# Patient Record
Sex: FEMALE | Race: BLACK OR AFRICAN AMERICAN | NOT HISPANIC OR LATINO | URBAN - METROPOLITAN AREA
[De-identification: names, ages, dates, MRNs, and addresses within clinical notes are randomized per-mention and may not be internally consistent; named-entity substitution may affect disease eponyms.]

---

## 2020-09-04 ENCOUNTER — HOSPITAL ENCOUNTER (OUTPATIENT)
Dept: OTHER | Facility: HOSPITAL | Age: 65
Discharge: HOME | End: 2020-09-04

## 2020-09-04 LAB
25(OH)D3 SERPL-MCNC: 22.7 NG/ML (ref 30–100)
ALBUMIN SERPL-MCNC: 2.8 G/DL (ref 3.5–5)
ALBUMIN/GLOB SERPL: 0.7 {RATIO} (ref 1.4–2.6)
ALP SERPL-CCNC: 90 U/L (ref 43–160)
ALT SERPL-CCNC: 14 U/L (ref 10–40)
ANION GAP SERPL CALC-SCNC: 19 MMOL/L (ref 8–19)
AST SERPL-CCNC: 42 U/L (ref 15–50)
BASOPHILS # BLD AUTO: 0.05 10*3/UL (ref 0–0.2)
BASOPHILS NFR BLD AUTO: 0.4 % (ref 0–3)
BILIRUB SERPL-MCNC: 0.27 MG/DL (ref 0.2–1.3)
BUN SERPL-MCNC: 19 MG/DL (ref 5–25)
BUN/CREAT SERPL: 25 {RATIO} (ref 6–20)
CALCIUM SERPL-MCNC: 10.3 MG/DL (ref 8.7–10.4)
CHLORIDE SERPL-SCNC: 98 MMOL/L (ref 99–111)
CONV ABS IMM GRAN: 0.13 10*3/UL (ref 0–0.2)
CONV CO2: 26 MMOL/L (ref 22–32)
CONV IMMATURE GRAN: 1.1 % (ref 0–1.8)
CONV TOTAL PROTEIN: 6.6 G/DL (ref 6.3–8.2)
CREAT UR-MCNC: 0.77 MG/DL (ref 0.5–0.9)
DEPRECATED RDW RBC AUTO: 46.5 FL (ref 36.4–46.3)
EOSINOPHIL # BLD AUTO: 0.31 10*3/UL (ref 0–0.7)
EOSINOPHIL # BLD AUTO: 2.5 % (ref 0–7)
ERYTHROCYTE [DISTWIDTH] IN BLOOD BY AUTOMATED COUNT: 15 % (ref 11.7–14.4)
GFR SERPLBLD BASED ON 1.73 SQ M-ARVRAT: >60 ML/MIN/{1.73_M2}
GLOBULIN UR ELPH-MCNC: 3.8 G/DL (ref 2–3.5)
GLUCOSE SERPL-MCNC: 141 MG/DL (ref 65–99)
HCT VFR BLD AUTO: 30.5 % (ref 37–47)
HGB BLD-MCNC: 10.2 G/DL (ref 12–16)
LYMPHOCYTES # BLD AUTO: 2.36 10*3/UL (ref 1–5)
LYMPHOCYTES NFR BLD AUTO: 19.4 % (ref 20–45)
MCH RBC QN AUTO: 29.8 PG (ref 27–31)
MCHC RBC AUTO-ENTMCNC: 33.4 G/DL (ref 33–37)
MCV RBC AUTO: 89.2 FL (ref 81–99)
MONOCYTES # BLD AUTO: 0.84 10*3/UL (ref 0.2–1.2)
MONOCYTES NFR BLD AUTO: 6.9 % (ref 3–10)
NEUTROPHILS # BLD AUTO: 8.48 10*3/UL (ref 2–8)
NEUTROPHILS NFR BLD AUTO: 69.7 % (ref 30–85)
NRBC CBCN: 0.2 % (ref 0–0.7)
OSMOLALITY SERPL CALC.SUM OF ELEC: 293 MOSM/KG (ref 273–304)
PLATELET # BLD AUTO: 306 10*3/UL (ref 130–400)
PMV BLD AUTO: 10.8 FL (ref 9.4–12.3)
POTASSIUM SERPL-SCNC: 3.9 MMOL/L (ref 3.5–5.3)
RBC # BLD AUTO: 3.42 10*6/UL (ref 4.2–5.4)
SODIUM SERPL-SCNC: 139 MMOL/L (ref 135–147)
WBC # BLD AUTO: 12.17 10*3/UL (ref 4.8–10.8)

## 2020-09-07 ENCOUNTER — HOSPITAL ENCOUNTER (OUTPATIENT)
Dept: OTHER | Facility: HOSPITAL | Age: 65
Discharge: HOME | End: 2020-09-07

## 2020-09-07 LAB
BASOPHILS # BLD AUTO: 0.03 10*3/UL (ref 0–0.2)
BASOPHILS NFR BLD AUTO: 0.4 % (ref 0–3)
CONV ABS IMM GRAN: 0.07 10*3/UL (ref 0–0.2)
CONV IMMATURE GRAN: 0.8 % (ref 0–1.8)
DEPRECATED RDW RBC AUTO: 46.4 FL (ref 36.4–46.3)
EOSINOPHIL # BLD AUTO: 0.26 10*3/UL (ref 0–0.7)
EOSINOPHIL # BLD AUTO: 3 % (ref 0–7)
ERYTHROCYTE [DISTWIDTH] IN BLOOD BY AUTOMATED COUNT: 14.7 % (ref 11.7–14.4)
HCT VFR BLD AUTO: 31 % (ref 37–47)
HGB BLD-MCNC: 10.1 G/DL (ref 12–16)
LYMPHOCYTES # BLD AUTO: 1.44 10*3/UL (ref 1–5)
LYMPHOCYTES NFR BLD AUTO: 16.8 % (ref 20–45)
MCH RBC QN AUTO: 29.3 PG (ref 27–31)
MCHC RBC AUTO-ENTMCNC: 32.6 G/DL (ref 33–37)
MCV RBC AUTO: 89.9 FL (ref 81–99)
MONOCYTES # BLD AUTO: 0.7 10*3/UL (ref 0.2–1.2)
MONOCYTES NFR BLD AUTO: 8.2 % (ref 3–10)
NEUTROPHILS # BLD AUTO: 6.06 10*3/UL (ref 2–8)
NEUTROPHILS NFR BLD AUTO: 70.8 % (ref 30–85)
NRBC CBCN: 0 % (ref 0–0.7)
PLATELET # BLD AUTO: 211 10*3/UL (ref 130–400)
PMV BLD AUTO: 10.8 FL (ref 9.4–12.3)
RBC # BLD AUTO: 3.45 10*6/UL (ref 4.2–5.4)
WBC # BLD AUTO: 8.56 10*3/UL (ref 4.8–10.8)

## 2020-09-08 ENCOUNTER — HOSPITAL ENCOUNTER (OUTPATIENT)
Dept: OTHER | Facility: HOSPITAL | Age: 65
Discharge: HOME | End: 2020-09-08

## 2020-09-08 LAB
ANION GAP SERPL CALC-SCNC: 17 MMOL/L (ref 8–19)
BUN SERPL-MCNC: 17 MG/DL (ref 5–25)
BUN/CREAT SERPL: 20 {RATIO} (ref 6–20)
CALCIUM SERPL-MCNC: 9.1 MG/DL (ref 8.7–10.4)
CHLORIDE SERPL-SCNC: 97 MMOL/L (ref 99–111)
CONV CO2: 28 MMOL/L (ref 22–32)
CREAT UR-MCNC: 0.84 MG/DL (ref 0.5–0.9)
GFR SERPLBLD BASED ON 1.73 SQ M-ARVRAT: >60 ML/MIN/{1.73_M2}
GLUCOSE SERPL-MCNC: 134 MG/DL (ref 65–99)
MAGNESIUM SERPL-MCNC: 1.64 MG/DL (ref 1.6–2.3)
OSMOLALITY SERPL CALC.SUM OF ELEC: 290 MOSM/KG (ref 273–304)
POTASSIUM SERPL-SCNC: 4 MMOL/L (ref 3.5–5.3)
SODIUM SERPL-SCNC: 138 MMOL/L (ref 135–147)

## 2020-09-10 ENCOUNTER — HOSPITAL ENCOUNTER (OUTPATIENT)
Dept: OTHER | Facility: HOSPITAL | Age: 65
Discharge: HOME | End: 2020-09-10

## 2020-09-10 LAB
ANION GAP SERPL CALC-SCNC: 14 MMOL/L (ref 8–19)
BASOPHILS # BLD AUTO: 0.03 10*3/UL (ref 0–0.2)
BASOPHILS NFR BLD AUTO: 0.3 % (ref 0–3)
BUN SERPL-MCNC: 16 MG/DL (ref 5–25)
BUN/CREAT SERPL: 23 {RATIO} (ref 6–20)
CALCIUM SERPL-MCNC: 8.7 MG/DL (ref 8.7–10.4)
CHLORIDE SERPL-SCNC: 94 MMOL/L (ref 99–111)
CONV ABS IMM GRAN: 0.05 10*3/UL (ref 0–0.2)
CONV CO2: 28 MMOL/L (ref 22–32)
CONV IMMATURE GRAN: 0.5 % (ref 0–1.8)
CREAT UR-MCNC: 0.71 MG/DL (ref 0.5–0.9)
DEPRECATED RDW RBC AUTO: 46.3 FL (ref 36.4–46.3)
EOSINOPHIL # BLD AUTO: 0.2 10*3/UL (ref 0–0.7)
EOSINOPHIL # BLD AUTO: 2 % (ref 0–7)
ERYTHROCYTE [DISTWIDTH] IN BLOOD BY AUTOMATED COUNT: 14.7 % (ref 11.7–14.4)
GFR SERPLBLD BASED ON 1.73 SQ M-ARVRAT: >60 ML/MIN/{1.73_M2}
GLUCOSE SERPL-MCNC: 136 MG/DL (ref 65–99)
HCT VFR BLD AUTO: 28.6 % (ref 37–47)
HGB BLD-MCNC: 9.5 G/DL (ref 12–16)
LYMPHOCYTES # BLD AUTO: 2.18 10*3/UL (ref 1–5)
LYMPHOCYTES NFR BLD AUTO: 22 % (ref 20–45)
MCH RBC QN AUTO: 29.4 PG (ref 27–31)
MCHC RBC AUTO-ENTMCNC: 33.2 G/DL (ref 33–37)
MCV RBC AUTO: 88.5 FL (ref 81–99)
MONOCYTES # BLD AUTO: 1.07 10*3/UL (ref 0.2–1.2)
MONOCYTES NFR BLD AUTO: 10.8 % (ref 3–10)
NEUTROPHILS # BLD AUTO: 6.39 10*3/UL (ref 2–8)
NEUTROPHILS NFR BLD AUTO: 64.4 % (ref 30–85)
NRBC CBCN: 0 % (ref 0–0.7)
OSMOLALITY SERPL CALC.SUM OF ELEC: 277 MOSM/KG (ref 273–304)
PLATELET # BLD AUTO: 202 10*3/UL (ref 130–400)
PMV BLD AUTO: 10.8 FL (ref 9.4–12.3)
POTASSIUM SERPL-SCNC: 3.9 MMOL/L (ref 3.5–5.3)
RBC # BLD AUTO: 3.23 10*6/UL (ref 4.2–5.4)
SODIUM SERPL-SCNC: 132 MMOL/L (ref 135–147)
WBC # BLD AUTO: 9.92 10*3/UL (ref 4.8–10.8)

## 2020-09-12 LAB
AMPICILLIN SUSC ISLT: >=32
AMPICILLIN+SULBAC SUSC ISLT: >=32
BACTERIA UR CULT: ABNORMAL
CEFAZOLIN SUSC ISLT: <=4
CEFEPIME SUSC ISLT: <=0.12
CEFTAZIDIME SUSC ISLT: <=1
CEFTRIAXONE SUSC ISLT: <=0.25
CIPROFLOXACIN SUSC ISLT: <=0.25
ERTAPENEM SUSC ISLT: <=0.12
GENTAMICIN SUSC ISLT: <=1
LEVOFLOXACIN SUSC ISLT: <=0.12
NITROFURANTOIN SUSC ISLT: <=16
PIP+TAZO SUSC ISLT: <=4
TMP SMX SUSC ISLT: <=20
TOBRAMYCIN SUSC ISLT: <=1

## 2020-09-13 ENCOUNTER — HOSPITAL ENCOUNTER (OUTPATIENT)
Dept: OTHER | Facility: HOSPITAL | Age: 65
Discharge: HOME | End: 2020-09-13

## 2020-09-13 LAB
ALBUMIN SERPL-MCNC: 2.4 G/DL (ref 3.5–5)
ALBUMIN/GLOB SERPL: 0.6 {RATIO} (ref 1.4–2.6)
ALP SERPL-CCNC: 75 U/L (ref 43–160)
ALT SERPL-CCNC: 16 U/L (ref 10–40)
ANION GAP SERPL CALC-SCNC: 17 MMOL/L (ref 8–19)
AST SERPL-CCNC: 29 U/L (ref 15–50)
BASOPHILS # BLD AUTO: 0.03 10*3/UL (ref 0–0.2)
BASOPHILS NFR BLD AUTO: 0.4 % (ref 0–3)
BILIRUB SERPL-MCNC: 0.29 MG/DL (ref 0.2–1.3)
BUN SERPL-MCNC: 12 MG/DL (ref 5–25)
BUN/CREAT SERPL: 18 {RATIO} (ref 6–20)
CALCIUM SERPL-MCNC: 10.2 MG/DL (ref 8.7–10.4)
CHLORIDE SERPL-SCNC: 100 MMOL/L (ref 99–111)
CONV ABS IMM GRAN: 0.05 10*3/UL (ref 0–0.2)
CONV CO2: 28 MMOL/L (ref 22–32)
CONV IMMATURE GRAN: 0.6 % (ref 0–1.8)
CONV TOTAL PROTEIN: 6.7 G/DL (ref 6.3–8.2)
CREAT UR-MCNC: 0.65 MG/DL (ref 0.5–0.9)
DEPRECATED RDW RBC AUTO: 47.1 FL (ref 36.4–46.3)
EOSINOPHIL # BLD AUTO: 0.32 10*3/UL (ref 0–0.7)
EOSINOPHIL # BLD AUTO: 4 % (ref 0–7)
ERYTHROCYTE [DISTWIDTH] IN BLOOD BY AUTOMATED COUNT: 14.9 % (ref 11.7–14.4)
GFR SERPLBLD BASED ON 1.73 SQ M-ARVRAT: >60 ML/MIN/{1.73_M2}
GLOBULIN UR ELPH-MCNC: 4.3 G/DL (ref 2–3.5)
GLUCOSE SERPL-MCNC: 119 MG/DL (ref 65–99)
HCT VFR BLD AUTO: 27.9 % (ref 37–47)
HGB BLD-MCNC: 9.3 G/DL (ref 12–16)
LYMPHOCYTES # BLD AUTO: 1.91 10*3/UL (ref 1–5)
LYMPHOCYTES NFR BLD AUTO: 24 % (ref 20–45)
MCH RBC QN AUTO: 28.8 PG (ref 27–31)
MCHC RBC AUTO-ENTMCNC: 33.3 G/DL (ref 33–37)
MCV RBC AUTO: 86.4 FL (ref 81–99)
MONOCYTES # BLD AUTO: 0.66 10*3/UL (ref 0.2–1.2)
MONOCYTES NFR BLD AUTO: 8.3 % (ref 3–10)
NEUTROPHILS # BLD AUTO: 5 10*3/UL (ref 2–8)
NEUTROPHILS NFR BLD AUTO: 62.7 % (ref 30–85)
NRBC CBCN: 0 % (ref 0–0.7)
OSMOLALITY SERPL CALC.SUM OF ELEC: 291 MOSM/KG (ref 273–304)
PLATELET # BLD AUTO: 229 10*3/UL (ref 130–400)
PMV BLD AUTO: 10.3 FL (ref 9.4–12.3)
POTASSIUM SERPL-SCNC: 4.5 MMOL/L (ref 3.5–5.3)
RBC # BLD AUTO: 3.23 10*6/UL (ref 4.2–5.4)
SODIUM SERPL-SCNC: 140 MMOL/L (ref 135–147)
WBC # BLD AUTO: 7.97 10*3/UL (ref 4.8–10.8)

## 2020-09-14 ENCOUNTER — HOSPITAL ENCOUNTER (OUTPATIENT)
Dept: OTHER | Facility: HOSPITAL | Age: 65
Discharge: HOME | End: 2020-09-14

## 2020-09-14 LAB
BASOPHILS # BLD AUTO: 0.03 10*3/UL (ref 0–0.2)
BASOPHILS NFR BLD AUTO: 0.4 % (ref 0–3)
CONV ABS IMM GRAN: 0.04 10*3/UL (ref 0–0.2)
CONV IMMATURE GRAN: 0.5 % (ref 0–1.8)
DEPRECATED RDW RBC AUTO: 47.7 FL (ref 36.4–46.3)
EOSINOPHIL # BLD AUTO: 0.4 10*3/UL (ref 0–0.7)
EOSINOPHIL # BLD AUTO: 5 % (ref 0–7)
ERYTHROCYTE [DISTWIDTH] IN BLOOD BY AUTOMATED COUNT: 15.2 % (ref 11.7–14.4)
HCT VFR BLD AUTO: 28.6 % (ref 37–47)
HGB BLD-MCNC: 9.1 G/DL (ref 12–16)
LYMPHOCYTES # BLD AUTO: 1.97 10*3/UL (ref 1–5)
LYMPHOCYTES NFR BLD AUTO: 24.5 % (ref 20–45)
MCH RBC QN AUTO: 27.7 PG (ref 27–31)
MCHC RBC AUTO-ENTMCNC: 31.8 G/DL (ref 33–37)
MCV RBC AUTO: 86.9 FL (ref 81–99)
MONOCYTES # BLD AUTO: 0.71 10*3/UL (ref 0.2–1.2)
MONOCYTES NFR BLD AUTO: 8.8 % (ref 3–10)
NEUTROPHILS # BLD AUTO: 4.9 10*3/UL (ref 2–8)
NEUTROPHILS NFR BLD AUTO: 60.8 % (ref 30–85)
NRBC CBCN: 0 % (ref 0–0.7)
PLATELET # BLD AUTO: 238 10*3/UL (ref 130–400)
PMV BLD AUTO: 9.9 FL (ref 9.4–12.3)
RBC # BLD AUTO: 3.29 10*6/UL (ref 4.2–5.4)
WBC # BLD AUTO: 8.05 10*3/UL (ref 4.8–10.8)

## 2020-09-15 LAB — BACTERIA BLD CULT: NORMAL

## 2020-09-21 ENCOUNTER — HOSPITAL ENCOUNTER (OUTPATIENT)
Dept: CT IMAGING | Facility: HOSPITAL | Age: 65
Discharge: HOME | End: 2020-09-21

## 2020-09-21 ENCOUNTER — HOSPITAL ENCOUNTER (OUTPATIENT)
Dept: OTHER | Facility: HOSPITAL | Age: 65
Discharge: HOME | End: 2020-09-21

## 2020-09-21 LAB
BASOPHILS # BLD AUTO: 0.06 10*3/UL (ref 0–0.2)
BASOPHILS NFR BLD AUTO: 0.6 % (ref 0–3)
CONV ABS IMM GRAN: 0.07 10*3/UL (ref 0–0.2)
CONV IMMATURE GRAN: 0.7 % (ref 0–1.8)
DEPRECATED RDW RBC AUTO: 50.6 FL (ref 36.4–46.3)
EOSINOPHIL # BLD AUTO: 0.34 10*3/UL (ref 0–0.7)
EOSINOPHIL # BLD AUTO: 3.3 % (ref 0–7)
ERYTHROCYTE [DISTWIDTH] IN BLOOD BY AUTOMATED COUNT: 15.9 % (ref 11.7–14.4)
HCT VFR BLD AUTO: 31.3 % (ref 37–47)
HGB BLD-MCNC: 9.7 G/DL (ref 12–16)
LYMPHOCYTES # BLD AUTO: 3.33 10*3/UL (ref 1–5)
LYMPHOCYTES NFR BLD AUTO: 32.8 % (ref 20–45)
MCH RBC QN AUTO: 27.6 PG (ref 27–31)
MCHC RBC AUTO-ENTMCNC: 31 G/DL (ref 33–37)
MCV RBC AUTO: 88.9 FL (ref 81–99)
MONOCYTES # BLD AUTO: 0.86 10*3/UL (ref 0.2–1.2)
MONOCYTES NFR BLD AUTO: 8.5 % (ref 3–10)
NEUTROPHILS # BLD AUTO: 5.5 10*3/UL (ref 2–8)
NEUTROPHILS NFR BLD AUTO: 54.1 % (ref 30–85)
NRBC CBCN: 0 % (ref 0–0.7)
PLATELET # BLD AUTO: 391 10*3/UL (ref 130–400)
PMV BLD AUTO: 9.9 FL (ref 9.4–12.3)
RBC # BLD AUTO: 3.52 10*6/UL (ref 4.2–5.4)
WBC # BLD AUTO: 10.16 10*3/UL (ref 4.8–10.8)

## 2020-09-25 NOTE — ER DOCUMENT REPORT
HPI





- HPI


Time Seen by Provider: 09/25/20 23:01


Notes: 





65-year-old female patient with history of stroke presenting to the emergency 

department requesting Hammer catheter removal.  Patient sister-in-law accompanies

her to the ED.  She states they just drove in from [place], the Hammer was place

d due to the long car ride.  Their doctor in [place] told him to come to the 

emergency room to have the Hammer removed.  Patient is moving here permanently.  

They are also requesting recommendation for primary care provider.  Patient 

denies any acute complaints today.  States she is feeling well.








- ROS


Systems Reviewed and Negative: Yes All other systems reviewed and negative





Past Medical History





- General


Information source: Patient





- Social History


Smoking Status: Never Smoker


Frequency of alcohol use: None


Drug Abuse: None


Family History: Reviewed & Not Pertinent





Vertical Provider Document





- CONSTITUTIONAL


Notes: 





PHYSICAL EXAMINATION:





GENERAL: Well-appearing, well-nourished and in no acute distress.





HEAD: Atraumatic, normocephalic.





EYES: Pupils equal round extraocular movements intact,  conjunctiva are normal.





ENT: Nares patent





NECK: Normal range of motion





LUNGS: No respiratory distress





Musculoskeletal: Normal range of motion





NEUROLOGICAL:  Normal speech. 





PSYCH: Normal mood, normal affect.





SKIN: Warm, Dry, normal turgor, no rashes or lesions noted.





Course





- Re-evaluation


Re-evalutation: 





Patient appears well, nontoxic.  She is here for Hammer catheter removal.  The 

catheter was placed for a long distance transport due to patient's immobility 

issues.  The Hammer catheter will be removed at this time.  Recommendations were 

made for primary care providers in the local area.  ED return precautions 

discussed.





- Vital Signs


Vital signs: 


                                        











Temp Pulse Resp BP Pulse Ox


 


 98.2 F   115 H  20   145/100 H  94 


 


 09/25/20 21:01  09/25/20 21:01  09/25/20 21:01  09/25/20 21:01  09/25/20 21:01














Discharge





- Discharge


Clinical Impression: 


 Encounter for Hammer catheter removal





Condition: Stable


Disposition: HOME, SELF-CARE


Additional Instructions: 


Please call Monday to establish care with a primary care provider.  Return to 

the emergency department with any new or worsening concerns.





Referrals: 


SHIVANI ANDRADE MD [ACTIVE STAFF] - Follow up as needed


GISELA DYKES MD [ACTIVE STAFF] - Follow up as needed

## 2020-10-07 NOTE — RADIOLOGY REPORT (SQ)
CLINICAL INDICATION: abd pain.



TECHNIQUE: 2 image(s) of the abdomen.  Supine imaging



COMPARISON: None.



FINDINGS: A nonspecific gas pattern is identified.  No evidence

of high grade obstruction.  No evidence of free air. 

Postsurgical change from tubal ligation. Mild hard stool right

colon.



IMPRESSION: No acute intra-abdominal process is identified.

## 2020-10-07 NOTE — RADIOLOGY REPORT (SQ)
EXAM DESCRIPTION: 



XR CHEST 1 VIEW



COMPLETED DATE/TME:  10/07/2020 20:22



CLINICAL HISTORY: 



65 years, Female, cp



COMPARISON:

None.



NUMBER OF VIEWS:

One



TECHNIQUE:

Single frontal view of the chest was obtained portably



LIMITATIONS:

None.



FINDINGS:



Cardiac and mediastinal contours are normal. Lungs are clear. No

pleural effusion or pneumothorax.



IMPRESSION:



No acute disease.

 



copyright 2011 [employer]- All Rights Reserved

## 2020-10-07 NOTE — ER DOCUMENT REPORT
ED Cardiac





- General


Chief Complaint: Chest Pain > 30


Stated Complaint: CHEST PAIN


Time Seen by Provider: 10/07/20 20:18


Mode of Arrival: Ambulatory


Information source: Patient


Notes: 


Last ED visit


Emergency Provider: KAY GARCES                  Account Number: [id number]


Date: 09/25/20 23:01                         Initialization Date: 09/25/20 23:01








HPI





- HPI


Time Seen by Provider: 09/25/20 23:01


Notes: 





65-year-old female patient with history of stroke presenting to the emergency 

department requesting Hammer catheter removal.  Patient sister-in-law accompanies

her to the ED.  She states they just drove in from [place], the Hammer was 

placed due to the long car ride.  Their doctor in [place] told him to come to 

the emergency room to have the Hammer removed.  Patient is moving here 

permanently.  They are also requesting recommendation for primary care provider.

 Patient denies any acute complaints today.  States she is feeling well.








- ROS


Systems Reviewed and Negative: Yes All other systems reviewed and negative





Past Medical History





- General


Information source: Patient





- Social History


Smoking Status: Never Smoker


Frequency of alcohol use: None


Drug Abuse: None


Family History: Reviewed & Not Pertinent





Vertical Provider Document





- CONSTITUTIONAL


Notes: 





PHYSICAL EXAMINATION:





GENERAL: Well-appearing, well-nourished and in no acute distress.





HEAD: Atraumatic, normocephalic.





EYES: Pupils equal round extraocular movements intact,  conjunctiva are normal.





ENT: Nares patent





NECK: Normal range of motion





LUNGS: No respiratory distress





Musculoskeletal: Normal range of motion





NEUROLOGICAL:  Normal speech. 





PSYCH: Normal mood, normal affect.





SKIN: Warm, Dry, normal turgor, no rashes or lesions noted.








MY HISTORY


65-year-old black female arrives by EMS with chief complaint of chest pain which

began around 1839 shortly after eating her supper which consisted of beans and 

chocolate pudding.  Her chest pain was 5 out of 5 and was substernal which has 

migrated to epigastric area according to patient and nursing staff.  Patient has

left-sided weakness secondary to a CVA that occurred a few months prior leaving 

her with sensation to her left upper and lower extremities but no movement.  

Patient has full range of motion of her right arm and right leg.  Patient 

reports she been without any blood thinner or any other medications since late 

last month when she moved from [place] to this area where she has been staying 

with her sister-in-law.  Patient presents with tachycardia at 152 atrial 

fibrillation.. Patient denies any history of atrial fibrillation in the past.  

As a historian I suspect the patient presents with some lacking information.  

She had some wheezing shortly before supper and had a albuterol treatment which 

helped with her wheezing.  She was clear to auscultation upon my initial exam at

2030


TRAVEL OUTSIDE OF THE [place] IN LAST 30 DAYS: No





- HPI


Patient complains to provider of: Chest pain, Chest tightness, Palpitations


Use of: Other - albuterol


Was the onset of pain: Sudden


Chest pain location: Substernal


Quality of pain: Moderate


Severity now: Severe


Pain level currently: 5





- Related Data


Allergies/Adverse Reactions: 


                                        





baclofen Allergy (Verified 10/07/20 20:12)


   


morphine Allergy (Verified 10/07/20 20:12)


   











Past Medical History





- General


Information source: Patient





- Social History


Smoking Status: Former Smoker


Cigarette use (# per day): No


Chew tobacco use (# tins/day): No


Smoking Education Provided: No


Frequency of alcohol use: None


Drug Abuse: None


Lives with: Family


Family History: Reviewed & Not Pertinent


Patient has suicidal ideation: No


Patient has homicidal ideation: No





- Past Medical History


Cardiac Medical History: Reports: Hx Atrial Fibrillation, Hx Hypertension


Pulmonary Medical History: Reports: Hx Asthma


Endocrine Medical History: Reports: Hx Diabetes Mellitus Type 2


Past Surgical History: Reports: Hx Neurologic Surgery - piece of right skull 

removed, Hx Tubal Ligation





Review of Systems





- Review of Systems


Constitutional: See HPI, Weakness


EENT: No symptoms reported


Cardiovascular: See HPI, Chest pain, Palpitations, Heart racing


Respiratory: See HPI, Wheezing


Gastrointestinal: See HPI, Abdominal pain


Genitourinary: No symptoms reported


Female Genitourinary: No symptoms reported


Musculoskeletal: No symptoms reported


Skin: No symptoms reported


Hematologic/Lymphatic: No symptoms reported


Neurological/Psychological: No symptoms reported





Physical Exam





- Vital signs


Vitals: 


                                        











Resp BP Pulse Ox


 


 17   132/91 H  95 


 


 10/07/20 20:00  10/07/20 20:00  10/07/20 20:00











Interpretation: Tachycardic





- General


General appearance: Alert, Other - morbidly obese





- HEENT


Head: Normocephalic, Atraumatic


Eyes: Normal


Pupils: PERRL





- Respiratory


Respiratory status: No respiratory distress


Chest status: Nontender


Breath sounds: Normal


Chest palpation: Normal





- Cardiovascular


Rhythm: Tachycardia


Heart sounds: Normal auscultation


Murmur: No





- Abdominal


Inspection: Normal


Distension: No distension


Bowel sounds: Normal


Tenderness: Nontender


Organomegaly: No organomegaly





- Rectal


Hemorrhoids: Other - deferred





- Genitourinary


Bimanuel exam: Other - deferred





- Back


Back: Normal, Nontender





- Extremities


General upper extremity: Nontender, Normal color, Normal temperature, Other - 

Left upper extremity weakness poor 


General lower extremity: Nontender, Normal color, Normal temperature, Other - 

Left lower extremity sensation intact but no movement.  No: Diana's sign





- Neurological


Neuro grossly intact: Yes


Cognition: Normal


Orientation: AAOx4


Casanova Coma Scale Eye Opening: Spontaneous


Casanova Coma Scale Verbal: Oriented


Casanova Coma Scale Motor: None - Left upper and left lower extremity with 

sensation but no range of motion limited motor with right upper extremity right 

lower extremity within normal limits good strength


Kyle Coma Scale Total: 10


Speech: Normal


Motor strength normal: RUE, RLE.  No: LUE, LLE


Additional motor exam normals: Weakness


Sensory: Normal





- Psychological


Associated symptoms: Normal affect, Normal mood





- Skin


Skin Temperature: Warm


Skin Moisture: Dry


Skin Color: Normal





Course





- Vital Signs


Vital signs: 


                                        











Temp Pulse Resp BP Pulse Ox


 


 97.3 F      18   154/89 H  95 


 


 10/07/20 20:10     10/07/20 21:46  10/07/20 21:46  10/07/20 21:46














- Laboratory


Result Diagrams: 


                                 10/07/20 20:20





                                 10/07/20 20:20


Laboratory results interpreted by me: 


                                        











  10/07/20 10/07/20





  20:20 20:20


 


Hct  35.4 L 


 


RDW  17.5 H 


 


Potassium   3.4 L


 


Glucose   132 H


 


Creatine Kinase   23 L














- Diagnostic Test


Radiology reviewed: Reports reviewed





- EKG Interpretation by Me


EKG shows normal: Sinus rhythm


Rate: Tachycardia - Heart rate ventricular 1 10-1 92 with atrial fibrillation 

and this was read by myself as well as the computer with the EKG.


Rhythm: A.Fib





Critical Care Note





- Critical Care Note


Comments: 





Patient's sister arrives with her medicine list which includes Tylenol albuterol

aspirin Keflex Senokot vitamin D3 Singulair Protonix MiraLAX pravastatin 

sucralfate venlafaxine HCTZ metoprolol and she reports her diltiazem  per 

24 hours" was stopped when she was discharged from hospital"; also patient has 

not on any other blood thinner except for aspirin.  I advised patient and sister

on selection of blood thinners she may use.


I discussed with Dr Moore at 2300 for consult.I had already spoken with 

Cornelia just prior.





Discharge





- Discharge


Clinical Impression: 


 Chest pain at rest





Atrial fibrillation


Qualifiers:


 Atrial fibrillation type: unspecified Qualified Code(s): I48.91 - Unspecified 

atrial fibrillation





Condition: Good


Disposition: ADMITTED AS INPATIENT


Admitting Provider: Carlos (Hospitalist)


Unit Admitted: Telemetry


Additional Instructions: 


Follow-up with personal doctor and take your medicines as directed to include 

diltiazem /per 24 hours.  Return to ER symptoms persist or return 

encourage fluids


Prescriptions: 


Diltiazem HCl [Diltiazem ER] 180 mg PO DAILY #30 tab.er.24h

## 2020-10-08 NOTE — EKG REPORT
SEVERITY:- ABNORMAL ECG -

ATRIAL FIBRILLATION, V-RATE 110-192

NONSPECIFIC T ABNORMALITIES, LATERAL LEADS

:

Confirmed by: Meche Pierson MD 08-Oct-2020 08:49:52

## 2020-10-08 NOTE — PDOC H&P
History of Present Illness


Admission Date/PCP: 


  10/07/20 23:30





  





History of Present Illness: 


SHUN SUAREZ is a 65 year old female, She has a history of stroke on 

2020 she just relocated from [place] she had intravenous TPA, 

thrombectomy complicated by cerebral edema and midline shift this was treated 

subsequently with decompressive hemicraniotomy on 2020.  She came to the e

mergency room for evaluation of chest pain, palpitation, paroxysmal atrial 

fibrillation.








Past Medical History


Cardiac Medical History: Reports: Atrial Fibrillation, Hypertension


Pulmonary Medical History: Reports: Asthma


Neurological Medical History: Reports: Ischemic CVA


Endocrine Medical History: Reports: Diabetes Mellitus Type 2


Psychiatric Medical History: Reports: Depression





Past Surgical History


Past Surgical History: Reports: Tubal Ligation





Social History


Lives with: Family


Smoking Status: Former Smoker


Cigarettes Packs Per Day: 0.5


Electronic Cigarette use?: Yes


Number of Years Smokin


Frequency of Alcohol Use: None


Hx Recreational Drug Use: No


Drugs: None


Hx Prescription Drug Abuse: No





Family History


Family History: Reviewed & Not Pertinent


Parental Family History Reviewed: Yes


Children Family History Reviewed: Yes


Sibling(s) Family History Reviewed.: Yes





Medication/Allergy


Home Medications: 








Acetaminophen [Tylenol 325 mg Tablet] 650 mg PO Q4HP PRN 10/08/20 


Albuterol Sulfate [Ventolin Hfa 8 gm Mdi] 2 puff IH TID 10/08/20 


Aspirin [Aspirin 325 mg Tablet] 325 mg PO DAILY 10/08/20 


Cephalexin Monohydrate [Keflex 250 mg Capsule] 250 mg PO QID MDD FOR 7 DAYS 

10/08/20 


Hydrochlorothiazide [Hydrodiuril 25 mg Tablet] 12.5 mg PO DAILY 10/08/20 


Metoprolol Tartrate [Lopressor 25 mg Tablet] 25 mg PO BID 10/08/20 


Montelukast Sodium [Singulair 10 mg Tablet] 10 mg PO QHS 10/08/20 


Pantoprazole Sodium [Protonix 40 mg Dr Tablet] 40 mg PO QAM 10/08/20 


Polyethylene Glycol 3350 [Miralax] 1 dose PO DAILY 10/08/20 


Pravastatin Sodium 40 mg PO QHS 10/08/20 


Sennosides/Docusate 8.6-50 mg [Senna Plus Tablet] 1 tab PO BID 10/08/20 


Sucralfate [Carafate 1 gm Tablet] 1 gm PO QID 10/08/20 


Venlafaxine HCl ER [Effexor Xr 75 mg Cap.sr] 75 mg PO DAILY 10/08/20 








Allergies/Adverse Reactions: 


                                        





baclofen Allergy (Verified 10/07/20 20:12)


   


morphine Allergy (Verified 10/07/20 20:12)


   











Review of Systems


Constitutional: ABSENT: chills, fever(s), headache(s), weight gain, weight loss


Eyes: ABSENT: visual disturbances


Ears: ABSENT: hearing changes


Cardiovascular: PRESENT: chest pain, palpitations


Respiratory: ABSENT: cough, hemoptysis


Gastrointestinal: ABSENT: abdominal pain, constipation, diarrhea, hematemesis, 

hematochezia, nausea, vomiting


Genitourinary: ABSENT: dysuria, hematuria


Musculoskeletal: ABSENT: joint swelling


Integumentary: ABSENT: rash, wounds


Neurological: ABSENT: abnormal gait, abnormal speech, confusion, dizziness, 

focal weakness, syncope


Psychiatric: ABSENT: anxiety, depression, homidical ideation, suicidal ideation


Endocrine: ABSENT: cold intolerance, heat intolerance, menstrual abnormalities, 

polydipsia, polyuria


Hematologic/Lymphatic: ABSENT: easy bleeding, easy bruising, lymphadenopathy





Physical Exam


Vital Signs: 


                                        











Temp Pulse Resp BP Pulse Ox


 


 97.7 F   74   19   124/84   94 


 


 10/08/20 15:57  10/08/20 15:57  10/08/20 15:57  10/08/20 15:57  10/08/20 15:57








                                 Intake & Output











 10/07/20 10/08/20 10/09/20





 06:59 06:59 06:59


 


Intake Total  1003 729


 


Balance  1003 729


 


Weight  111.6 kg 











General appearance: PRESENT: no acute distress


Head exam: PRESENT: normocephalic, other - There is surgical scar on the left 

head


Eye exam: PRESENT: PERRLA


Mouth exam: PRESENT: moist, tongue midline


Neck exam: PRESENT: full ROM


Respiratory exam: PRESENT: clear to auscultation nando


Cardiovascular exam: PRESENT: RRR, +S1, +S2


Vascular exam: PRESENT: normal capillary refill


GI/Abdominal exam: PRESENT: normal bowel sounds, soft


Rectal exam: PRESENT: deferred


Neurological exam: PRESENT: alert, CN II-XII grossly intact


Psychiatric exam: PRESENT: appropriate affect, normal mood


Skin exam: PRESENT: dry, intact, warm





Results


Laboratory Results: 


                                        





                                 10/07/20 20:20 





                                 10/07/20 20:20 





                                        











  10/07/20 10/08/20 10/08/20





  22:44 05:38 05:38


 


Magnesium   1.6 


 


TSH    1.30


 


Free T4    1.47


 


Urine Color  YELLOW  


 


Urine Appearance  SLIGHTLY-CLOUDY  


 


Urine pH  5.0  


 


Ur Specific Gravity  1.016  


 


Urine Protein  NEGATIVE  


 


Urine Glucose (UA)  NEGATIVE  


 


Urine Ketones  NEGATIVE  


 


Urine Blood  NEGATIVE  


 


Urine Nitrite  NEGATIVE  


 


Ur Leukocyte Esterase  NEGATIVE  


 


Urine WBC (Auto)  2  


 


Urine RBC (Auto)  1  








                                        











  10/07/20 10/07/20 10/07/20





  20:20 20:20 23:27


 


Creatine Kinase  23 L  


 


CK-MB (CK-2)   0.32 


 


Troponin I   < 0.012  < 0.012


 


NT-Pro-B Natriuret Pep   














  10/08/20 10/08/20 10/08/20





  05:38 05:38 14:47


 


Creatine Kinase  < 20 L   < 20 L


 


CK-MB (CK-2)   0.29 


 


Troponin I   < 0.012 


 


NT-Pro-B Natriuret Pep   635 H 














  10/08/20 10/08/20 10/08/20





  14:47 20:56 20:56


 


Creatine Kinase   < 20 L 


 


CK-MB (CK-2)  0.34   0.37


 


Troponin I  < 0.012   < 0.012


 


NT-Pro-B Natriuret Pep   











Impressions: 


                                        





Chest X-Ray  10/07/20 20:22


IMPRESSION:


 


No acute disease.


 


 


copyright  [employer]- All Rights Reserved


 








KUB X-Ray  10/07/20 20:31


IMPRESSION: No acute intra-abdominal process is identified.


 














Assessment & Plan





- Diagnosis


(1) Paroxysmal atrial fibrillation with rapid ventricular response


Is this a current diagnosis for this admission?: Yes   


Plan: 


Patient treated with Cardizem infusion, presently rate controlled, not presently

on anticoagulation because of recent intracranial surgery, she is to return to 

[place] for completion of the procedure








(2) Chest pain at rest


Is this a current diagnosis for this admission?: Yes   





- Time


Time Spent: Greater than 70 Minutes


Medications reviewed and adjusted accordingly: Yes


Anticipated Discharge Disposition: Home, Self Care


Anticipated Discharge Timeframe: within 72 hours





- Inpatient Certification


Based on my medical assessment, after consideration of the patient's 

comorbidities, presenting symptoms, or acuity I expect that the services needed 

warrant INPATIENT care.: Yes


I certify that my determination is in accordance with my understanding of 

Medicare's requirements for reasonable and necessary INPATIENT services [42 CFR 

412.3e].: Yes

## 2020-10-08 NOTE — PDOC CONSULTATION
Consultation


Consult Date: 10/08/20


Attending physician:: SITA CARMONA JR


Provider Consulted: LEYDA HUNT


Consult reason:: Rapid a-fib





History of Present Illness


Admission Date/PCP: 


  10/07/20 23:30





  





History of Present Illness: 


SHUN SUAREZ is a 65 year old female with history of stroke on 2020 

secondary to right M1 occlusion initially treated with IV TPA followed by 

mechanical thrombectomy, complicated by cerebral edema and midline shift which 

was treated with decompressive hemicraniectomy on 2020, hypertension, 

hyperlipidemia, diabetes, asthma, arthritis, seasonal allergies, tobacco use but

quit 5 months ago, paroxysmal atrial fibrillation who is consulted to our 

service for further evaluation and treatment of atrial fibrillation.  The caty hoang came to the emergency room by ambulance complaining of chest pain after 

eating supper.  She describes her pain as sharp in nature, substernal in 

location, lasting approximately 10 minutes, with radiation to the epigastrium 

and she thought it was from reflux disease, associated with shortness of breath 

and without diaphoresis, palpitations, syncope or presyncope.  In the emergency 

room she was found to be in rapid atrial fibrillation and was started on a 

Cardizem drip.  This morning she continues to be at her baseline and denies new 

cardiac complaints.  She denies recurrence of chest pain.  Unfortunately she 

continues to be in rapid atrial fibrillation.





Physical exam on 10/08/2020:


GENERAL:  Pleasant and conversational.  Oriented x3 with normal mood.  Not in 

acute distress.  Well groomed and well developed.


HEENT:  Normocephalic, atraumatic.  Pupils equal.   Sclerae anicteric.  Orop

harynx moist. 


NECK:  No JVD.  No carotid bruits. 


LUNGS:  Clear to auscultation bilaterally.  Normal respiratory effort without 

the use of accessory muscles or intercostal retractions.  


CARDIOVASCULAR: Tachycardic, irregularly irregular rate and rhythm, normal S1 

and S2 without murmurs, rubs, or gallops.  PMI not displaced.


ABDOMEN: No masses or tenderness to palpation.  No bruit.  No splenomegaly or 

hepatomegaly. No abdominal aorta bruit noted.


EXTREMITIES:  No edema, no cyanosis, no clubbing.  +2 pulses femoral and pedal 

pulses bilaterally.  


SKIN: No lesions or rashes.


MUSCULOSKELETAL:  No chest tenderness to palpation. 











Past Medical History


Cardiac Medical History: Reports: Atrial Fibrillation, Hypertension


Pulmonary Medical History: Reports: Asthma


Endocrine Medical History: Reports: Diabetes Mellitus Type 2


Psychiatric Medical History: Reports: Depression





Past Surgical History


Past Surgical History: Reports: Tubal Ligation





Social History


Lives with: Family


Smoking Status: Former Smoker


Cigarettes Packs Per Day: 0.5


Electronic Cigarette use?: Yes


Number of Years Smokin


Frequency of Alcohol Use: None


Hx Recreational Drug Use: No


Drugs: None


Hx Prescription Drug Abuse: No





Family History


Family History: Reviewed & Not Pertinent


Parental Family History Reviewed: Yes


Children Family History Reviewed: Yes


Sibling(s) Family History Reviewed.: Yes





Medication/Allergy


Home Medications: 








Diltiazem HCl [Diltiazem ER] 180 mg PO DAILY #30 tab.er.24h 10/07/20 








Allergies/Adverse Reactions: 


                                        





baclofen Allergy (Verified 10/07/20 20:12)


   


morphine Allergy (Verified 10/07/20 20:12)


   











Physical Exam


Vital Signs: 


                                        











Temp Pulse Resp BP Pulse Ox


 


 98.2 F   104 H  28 H  123/68   97 


 


 10/08/20 04:13  10/08/20 04:13  10/08/20 04:13  10/08/20 04:13  10/08/20 04:13








                                 Intake & Output











 10/06/20 10/07/20 10/08/20





 06:59 06:59 06:59


 


Intake Total   1000


 


Balance   1000


 


Weight   111.6 kg














Results


Laboratory Results: 


                                        





                                 10/07/20 20:20 





                                 10/07/20 20:20 





                                        











  10/07/20 10/07/20 10/07/20





  20:20 20:20 22:44


 


WBC  9.8  


 


RBC  4.22  


 


Hgb  12.0  


 


Hct  35.4 L  


 


MCV  84  


 


MCH  28.4  


 


MCHC  33.8  


 


RDW  17.5 H  


 


Plt Count  325  


 


Seg Neutrophils %  46.4  


 


Sodium   137.8 


 


Potassium   3.4 L 


 


Chloride   98 


 


Carbon Dioxide   28 


 


Anion Gap   12 


 


BUN   11 


 


Creatinine   0.71 


 


Est GFR ( Amer)   > 60 


 


Glucose   132 H 


 


Calcium   9.4 


 


Magnesium   


 


Total Bilirubin   0.4 


 


AST   27 


 


Alkaline Phosphatase   95 


 


Total Protein   7.1 


 


Albumin   3.6 


 


Urine Color    YELLOW


 


Urine Appearance    SLIGHTLY-CLOUDY


 


Urine pH    5.0


 


Ur Specific Gravity    1.016


 


Urine Protein    NEGATIVE


 


Urine Glucose (UA)    NEGATIVE


 


Urine Ketones    NEGATIVE


 


Urine Blood    NEGATIVE


 


Urine Nitrite    NEGATIVE


 


Ur Leukocyte Esterase    NEGATIVE


 


Urine WBC (Auto)    2


 


Urine RBC (Auto)    1














  10/08/20





  05:38


 


WBC 


 


RBC 


 


Hgb 


 


Hct 


 


MCV 


 


MCH 


 


MCHC 


 


RDW 


 


Plt Count 


 


Seg Neutrophils % 


 


Sodium 


 


Potassium 


 


Chloride 


 


Carbon Dioxide 


 


Anion Gap 


 


BUN 


 


Creatinine 


 


Est GFR (African Amer) 


 


Glucose 


 


Calcium 


 


Magnesium  1.6


 


Total Bilirubin 


 


AST 


 


Alkaline Phosphatase 


 


Total Protein 


 


Albumin 


 


Urine Color 


 


Urine Appearance 


 


Urine pH 


 


Ur Specific Gravity 


 


Urine Protein 


 


Urine Glucose (UA) 


 


Urine Ketones 


 


Urine Blood 


 


Urine Nitrite 


 


Ur Leukocyte Esterase 


 


Urine WBC (Auto) 


 


Urine RBC (Auto) 








                                        











  10/07/20 10/07/20 10/07/20





  20:20 20:20 23:27


 


Creatine Kinase  23 L  


 


CK-MB (CK-2)   0.32 


 


Troponin I   < 0.012  < 0.012


 


NT-Pro-B Natriuret Pep   














  10/08/20 10/08/20





  05:38 05:38


 


Creatine Kinase  < 20 L 


 


CK-MB (CK-2)   0.29


 


Troponin I   < 0.012


 


NT-Pro-B Natriuret Pep   635 H











Impressions: 


                                        





Chest X-Ray  10/07/20 20:22


IMPRESSION:


 


No acute disease.


 


 


copyright 2011 [employer]- All Rights Reserved


 








KUB X-Ray  10/07/20 20:31


IMPRESSION: No acute intra-abdominal process is identified.


 








                                        





                                 10/07/20 20:20 





                                 10/07/20 20:20 





                                        











MCV  84 fl (80-97)   10/07/20  20:20    


 


MCH  28.4 pg (27.0-33.4)   10/07/20  20:20    


 


MCHC  33.8 g/dL (32.0-36.0)   10/07/20  20:20    


 


RDW  17.5 % (11.5-14.0)  H  10/07/20  20:20    


 


Seg Neutrophils %  46.4 % (42-78)   10/07/20  20:20    


 


Chloride  98 mmol/L ()   10/07/20  20:20    


 


Carbon Dioxide  28 mmol/L (22-30)   10/07/20  20:20    


 


Anion Gap  12  (5-19)   10/07/20  20:20    


 


Est GFR ( Amer)  > 60  (>60)   10/07/20  20:20    


 


Glucose  132 mg/dL ()  H  10/07/20  20:20    


 


Calcium  9.4 mg/dL (8.4-10.2)   10/07/20  20:20    


 


Magnesium  1.6 mg/dL (1.6-2.3)   10/08/20  05:38    


 


Total Bilirubin  0.4 mg/dL (0.2-1.3)   10/07/20  20:20    


 


AST  27 U/L (14-36)   10/07/20  20:20    


 


Alkaline Phosphatase  95 U/L ()   10/07/20  20:20    


 


Total Protein  7.1 g/dL (6.3-8.2)   10/07/20  20:20    


 


Albumin  3.6 g/dL (3.5-5.0)   10/07/20  20:20    


 


Urine Color  YELLOW   10/07/20  22:44    


 


Urine Appearance  SLIGHTLY-CLOUDY   10/07/20  22:44    


 


Urine pH  5.0  (5.0-9.0)   10/07/20  22:44    


 


Ur Specific Gravity  1.016   10/07/20  22:44    


 


Urine Protein  NEGATIVE mg/dL (NEGATIVE)   10/07/20  22:44    


 


Urine Glucose (UA)  NEGATIVE mg/dL (NEGATIVE)   10/07/20  22:44    


 


Urine Ketones  NEGATIVE mg/dL (NEGATIVE)   10/07/20  22:44    


 


Urine Blood  NEGATIVE  (NEGATIVE)   10/07/20  22:44    


 


Urine Nitrite  NEGATIVE  (NEGATIVE)   10/07/20  22:44    


 


Ur Leukocyte Esterase  NEGATIVE  (NEGATIVE)   10/07/20  22:44    


 


Urine WBC (Auto)  2 /HPF  10/07/20  22:44    


 


Urine RBC (Auto)  1 /HPF  10/07/20  22:44    








                                        











  10/07/20 10/07/20 10/07/20





  20:20 20:20 23:27


 


Creatine Kinase  23 L  


 


CK-MB (CK-2)   0.32 


 


Troponin I   < 0.012  < 0.012


 


NT-Pro-B Natriuret Pep   














  10/08/20 10/08/20





  05:38 05:38


 


Creatine Kinase  < 20 L 


 


CK-MB (CK-2)   0.29


 


Troponin I   < 0.012


 


NT-Pro-B Natriuret Pep   635 H








                             Current Medication List











Generic Name Dose Route Start Last Admin





  Trade Name Freq  PRN Reason Stop Dose Admin


 


Enoxaparin Sodium  40 mg  10/08/20 10:00 





  Lovenox Inj 40 Mg/0.4 Ml Disp.Syrin  SUBCUT  20 09:59 





  DAILY CHAPITO  


 


Diltiazem HCl  125 mg in 125 mls @ 0 mls/hr  10/08/20 04:39  10/08/20 05:17





  Cardizem Rtu Inj 125 Mg-D5w 125 Ml Premix  IV  20 04:38  5 mls/hr





  CONTINUOUS PRN   5 mls/hr





  THIS MED IS NOT "PRN"   Administration





  Protocol  





  Titrate  














Discontinued Medications














Generic Name Dose Route Start Last Admin





  Trade Name Ju  PRN Reason Stop Dose Admin


 


Aspirin  324 mg  10/07/20 20:35  10/07/20 20:46





  Aspirin 81 Mg Chewable Tablet  PO  10/07/20 20:36  324 mg





  NOW ONE   Administration


 


Diltiazem HCl  5 mg  10/07/20 20:32  10/07/20 20:47





  Cardizem Inj 25 Mg/5 Ml Vial  IV  10/07/20 20:33  5 mg





  NOW ONE   Administration


 


Diltiazem HCl  180 mg  10/07/20 22:44  10/07/20 22:51





  Cardizem Cd 180 Mg Capsule  PO  10/07/20 22:45  Not Given





  NOW ONE  


 


Diltiazem HCl  10 mg  10/07/20 22:49  10/07/20 23:01





  Cardizem Inj 25 Mg/5 Ml Vial  IV  10/07/20 22:50  10 mg





  NOW ONE   Administration


 


Enoxaparin Sodium  120 mg  10/07/20 20:34  10/07/20 20:49





  Lovenox Inj 120 Mg/0.8 Ml Disp.Syrin  SUBCUT  10/07/20 20:35  120 mg





  NOW ONE   Administration


 


Sodium Chloride  1,000 mls @ 0 mls/hr  10/07/20 20:31  10/08/20 00:16





  Nacl 0.9% 1000 Ml Iv Soln  IV  10/07/20 20:32  Infused





  BOLUS ONE   Infusion





  Wide Open  


 


Oxycodone/Acetaminophen  1 tab  10/07/20 23:09  10/07/20 23:32





  Percocet 5-325 Mg Tablet  PO  10/07/20 23:10  1 tab





  NOW ONE   Administration


 


Simethicone  160 mg  10/07/20 21:32  10/07/20 21:53





  Mylicon 80 Mg Chewable Tablet  PO  10/07/20 21:33  160 mg





  NOW ONE   Administration














Assessment & Plan





- Diagnosis


(1) Atrial fibrillation


Qualifiers: 


   Atrial fibrillation type: unspecified   Qualified Code(s): I48.91 - 

Unspecified atrial fibrillation   


Is this a current diagnosis for this admission?: Yes   


Plan: 


The patient continues to be in rapid atrial fibrillation despite maximal doses 

of diltiazem IV.  Given her cardiac history she would benefit more from a beta-

blocker at this point.  She should be anticoagulated however when she was last 

evaluated in 2020 due to her stroke she was not cleared for full 

anticoagulation.





Recommendations:


-Discontinue diltiazem drip.


-Start Lopressor 50 mg p.o. every 6 hours.


-Hold anticoagulation until cleared by neurology.


-Restart outpatient medical regimen except diltiazem, modafinil.


-DVT prophylaxis.


-Echocardiogram.








(2) Elevated brain natriuretic peptide (BNP) level


Plan: 


Likely secondary to her atrial fibrillation.  There is no clinical or physical 

exam evidence of heart failure.  The patient is resting comfortably on her back 

without dyspnea.  Her chest x-ray did not show evidence of heart failure.





Recommendations:


-Continue to follow clinically.

## 2020-10-08 NOTE — XCELERA REPORT
[address] [address]

                               Tel: [phone]

                               Fax: [phone]



                      Transthoracic Echocardiogram Report

_______________________________________________________________________________



Name: SHUN SUAREZ

MRN: [id number]                           Age: 65 yrs

Gender: Female                            : 1955

Patient Status: Inpatient                 Patient Location: [address] [place] [address]

Account #: [id number]

Study Date: 10/08/2020 05:27 PM

Accession #: [id number]

_______________________________________________________________________________



Height: 65 in        Weight: 246 lb        BSA: 2.2 m2

_______________________________________________________________________________

Procedure: A complete two-dimensional transthoracic echocardiogram was

performed (2D, M-mode, spectral and color flow Doppler). The study was

technically limited with all images being suboptimal in quality. The subcostal

views were difficult to obtain and are suboptimal in quality.

Reason For Study: Atrial fibrillation





Ordering Physician: LEYDA HUNT

Performed By: Nalini Duke



_______________________________________________________________________________



Interpretation Summary

The left ventricle is normal in size.

Left ventricular systolic function is normal.

The Ejection Fraction estimate is 60-65%.

LV diastolic function could not be adequately assessed due to atrial

fibrilation.

The left ventricular wall motion is normal.

There is no thrombus.

Cannot r/o a small, inlet VSD with left to right shunt. Recommend SHELDON for

better visualization and characterization.

Mild MR, mild AI, mild TR, mild PI.

No prior studies for comparison.



MMode/2D Measurements & Calculations

RVDd: 2.9 cm        LVIDd: 5.2 cm      FS: 44.2 %         Ao root diam: 3.1 cm

IVSd: 1.2 cm        LVIDs: 2.9 cm      EDV(Teich):        Ao root area:

                                       131.8 ml

                    LVPWd: 1.1 cm                         7.6 cm2

                                       ESV(Teich): 32.9 mlLA dimension: 3.0 cm

                                       EF(Teich): 75.0 %

        _______________________________________________________________

LVLd ap4: 5.5 cm    SV(MOD-sp4):

EDV(MOD-sp4):       36.0 ml

54.0 ml

LVLs ap4: 5.0 cm

ESV(MOD-sp4):

18.0 ml

EF(MOD-sp4): 66.7 %



Doppler Measurements & Calculations

MV E max moreno:      MV P1/2t max moreno:    Ao V2 max:        AI max moreno:

107.0 cm/sec       116.7 cm/sec         101.4 cm/sec      349.9 cm/sec

MV A max moreno:      MV P1/2t: 62.6 msec  Ao max PG:        AI max P.0 cm/sec                             4.1 mmHg          49.0 mmHg

                   MVA(P1/2t): 3.5 cm2

MV E/A: 3.7        MV dec slope:                          AI dec slope:

                   546.5 cm/sec2                          159.9 cm/sec2

                   MV dec time:                           AI P1/2t:

                   0.18 sec                               640.9 msec



        _______________________________________________________________

LV V1 max PG:      PA V2 max:           PI end-d moreno:     TR max moreno:

2.9 mmHg           86.4 cm/sec          162.9 cm/sec      246.1 cm/sec

LV V1 max:         PA max PG: 3.0 mmHg                    TR max P.9 cm/sec                                               24.2 mmHg

        _______________________________________________________________

AV P1/2t-pr_phl:   MV P1/2t-pr_phl:

640.9 msec         62.6 msec





Left Ventricle

The left ventricle is normal in size. Left ventricular systolic function is

normal. The Ejection Fraction estimate is 60-65%. LV diastolic function could

not be adequately assessed due to atrial fibrilation. The left ventricular

wall motion is normal. There is no thrombus. Cannot r/o a small, inlet VSD

with left to right shunt. Recommend SHELDON for better visualization and

characterization.



Right Ventricle

The right ventricle is normal in size, thickness and function. The right

ventricular systolic function is normal.



Atria

The right atrium is normal. The left atrial size is normal.



Mitral Valve

The mitral valve is grossly normal. There is no evidence of mitral valve

prolapse. There is no mitral valve stenosis. There is a mild amount of mitral

regurgitation.





Aortic Valve

The aortic valve is grossly normal. There is no aortic valvular vegetation.

There is no aortic valve stenosis. There is a mild amount of aortic

regurgitation.



Tricuspid Valve

The tricuspid valve is not well visualized, but is grossly normal. There is no

tricuspid valve prolapse. There is no tricuspid stenosis. There is a mild

amount of tricuspid regurgitation.



Pulmonic Valve

The pulmonic valve is not well seen, but is grossly normal. There is no

pulmonic valvular stenosis. There is a mild amount of pulmonic regurgitation.



Effusions

There is no pericardial effusion. There is no pleural effusion.





_______________________________________________________________________________

_______________________________________________________________________________



Electronically signed by:      Leyda Hunt      on 10/08/2020 08:35 PM



CC: LEYDA HUNT, Leyda

## 2020-10-09 NOTE — PDOC PROGRESS REPORT
Subjective


Progress Note for:: 10/09/20


Subjective:: 





Patient seen by the bedside presently ruled out for acute MI, she was seen by 

the cardiology hopefully discharge home tomorrow


Reason For Visit: 


ATRIAL FIBRILLATION WITH RAPID VENTRICULAR RESPONS








Physical Exam


Vital Signs: 


                                        











Temp Pulse Resp BP Pulse Ox


 


 98.0 F   95   19   115/83   95 


 


 10/09/20 16:15  10/09/20 16:15  10/09/20 16:15  10/09/20 16:15  10/09/20 16:15








                                 Intake & Output











 10/08/20 10/09/20 10/10/20





 06:59 06:59 06:59


 


Intake Total 1003 1219 1216


 


Balance 1003 1219 1216


 


Weight 111.6 kg 116.1 kg 











General appearance: PRESENT: no acute distress


Eye exam: PRESENT: PERRLA


Respiratory exam: PRESENT: clear to auscultation nando


Cardiovascular exam: PRESENT: +S1, +S2





Results


Laboratory Results: 


                                        





                                 10/09/20 06:08 





                                 10/09/20 06:08 





                                        











  10/09/20 10/09/20 10/09/20





  06:08 06:08 06:08


 


WBC  6.7  


 


RBC  4.13  


 


Hgb  11.7 L  


 


Hct  34.6 L  


 


MCV  84  


 


MCH  28.4  


 


MCHC  33.9  


 


RDW  17.4 H  


 


Plt Count  286  


 


Seg Neutrophils %  50.6  


 


Sodium   138.6  Cancelled


 


Potassium   3.5 L  Cancelled


 


Chloride   100  Cancelled


 


Carbon Dioxide   30  Cancelled


 


Anion Gap   9  Cancelled


 


BUN   11  Cancelled


 


Creatinine   0.64  Cancelled


 


Est GFR ( Amer)   > 60  Cancelled


 


Est GFR (Non-Af Amer)    Cancelled


 


Glucose   114 H  Cancelled


 


Calcium   9.6  Cancelled


 


Total Bilirubin   0.5 


 


AST   24 


 


Alkaline Phosphatase   92 


 


Total Protein   6.7 


 


Albumin   3.3 L 


 


Triglycerides   173 H 


 


Cholesterol   139.85 


 


LDL Cholesterol Direct   77 


 


VLDL Cholesterol   34.6 H 


 


HDL Cholesterol   31 L 








                                        





10/07/20 22:44   Clean Catch Midstream   Urine Culture - Final


                            Mixed Skin. Possible Pathogen





                                        











  10/07/20 10/07/20 10/07/20





  20:20 20:20 23:27


 


Creatine Kinase  23 L  


 


CK-MB (CK-2)   0.32 


 


Troponin I   < 0.012  < 0.012


 


NT-Pro-B Natriuret Pep   














  10/08/20 10/08/20 10/08/20





  05:38 05:38 14:47


 


Creatine Kinase  < 20 L   < 20 L


 


CK-MB (CK-2)   0.29 


 


Troponin I   < 0.012 


 


NT-Pro-B Natriuret Pep   635 H 














  10/08/20 10/08/20 10/08/20





  14:47 20:56 20:56


 


Creatine Kinase   < 20 L 


 


CK-MB (CK-2)  0.34   0.37


 


Troponin I  < 0.012   < 0.012


 


NT-Pro-B Natriuret Pep   














  10/09/20





  06:08


 


Creatine Kinase 


 


CK-MB (CK-2) 


 


Troponin I 


 


NT-Pro-B Natriuret Pep  604 H











Impressions: 


                                        





Chest X-Ray  10/07/20 20:22


IMPRESSION:


 


No acute disease.


 


 


copyright 2011 [employer]- All Rights Reserved


 








KUB X-Ray  10/07/20 20:31


IMPRESSION: No acute intra-abdominal process is identified.


 














Assessment & Plan





- Diagnosis


(1) Paroxysmal atrial fibrillation with rapid ventricular response


Is this a current diagnosis for this admission?: Yes   





(2) Chest pain at rest


Is this a current diagnosis for this admission?: Yes   





- Time


Time Spent with patient: 25-34 minutes


Level of Care: IMCU


Medications reviewed and adjusted accordingly: Yes


Anticipated discharge: Home


Anticipated DC Timeframe: within 36 hours

## 2020-10-09 NOTE — PDOC PROGRESS REPORT
Subjective


Progress Note for:: 10/09/20


Subjective:: 


SHUN SUAREZ is a 65 year old female with history of stroke on 09/21/2020 

secondary to right M1 occlusion initially treated with IV TPA followed by 

mechanical thrombectomy, complicated by cerebral edema and midline shift which 

was treated with decompressive hemicraniectomy on 09/22/2020, hypertension, 

hyperlipidemia, diabetes, asthma, arthritis, seasonal allergies, tobacco use but

quit 5 months ago, paroxysmal atrial fibrillation who is consulted to our 

service for further evaluation and treatment of atrial fibrillation.  The 

patient came to the emergency room by ambulance complaining of chest pain after 

eating supper.  She describes her pain as sharp in nature, substernal in 

location, lasting approximately 10 minutes, with radiation to the epigastrium 

and she thought it was from reflux disease, associated with shortness of breath 

and without diaphoresis, palpitations, syncope or presyncope.  In the emergency 

room she was found to be in rapid atrial fibrillation and was started on a 

Cardizem drip.  This morning she continues to be at her baseline and denies new 

cardiac complaints.  She denies recurrence of chest pain.  Unfortunately she 

continues to be in rapid atrial fibrillation.





10/09/2020:


The patient had an uneventful night and feels better this morning.  Her blood 

pressure is now at goal.  She has received 3 doses of Lopressor 50 mg p.o. with 

an improvement in her ventricular response.  Her telemetry shows A. fib with a 

significantly improved ventricular response.





Physical exam on 10/09/2020:


GENERAL:  Pleasant and conversational.  Oriented x3 with normal mood.  Not in 

acute distress.  Well groomed and well developed.


HEENT:  Normocephalic, atraumatic.  Pupils equal.   Sclerae anicteric.  

Oropharynx moist. 


NECK:  No JVD.  No carotid bruits. 


LUNGS:  Clear to auscultation bilaterally.  Normal respiratory effort without 

the use of accessory muscles or intercostal retractions.  


CARDIOVASCULAR: Irregularly irregular rate and rhythm, normal S1 and S2 without 

murmurs, rubs, or gallops.  PMI not displaced.


ABDOMEN: No masses or tenderness to palpation.  No bruit.  No splenomegaly or 

hepatomegaly. No abdominal aorta bruit noted.


EXTREMITIES:  No edema, no cyanosis, no clubbing.  +2 pulses femoral and pedal 

pulses bilaterally.  


SKIN: No lesions or rashes.


MUSCULOSKELETAL:  No chest tenderness to palpation.





Cardiac studies:


Echocardiogram on 10/08/2020:


-EF 60 to 65%.


-No wall motion abnormalities.


-Possible inlet VSD.


-Mild MR, mild AI, mild TR, mild PI.





Reason For Visit: 


ATRIAL FIBRILLATION WITH RAPID VENTRICULAR RESPONS








Physical Exam


Vital Signs: 


                                        











Temp Pulse Resp BP Pulse Ox


 


 98.0 F   95   22 H  102/66   96 


 


 10/09/20 04:04  10/09/20 04:04  10/09/20 04:04  10/09/20 04:04  10/09/20 04:04








                                 Intake & Output











 10/07/20 10/08/20 10/09/20





 06:59 06:59 06:59


 


Intake Total  1003 1019


 


Balance  1003 1019


 


Weight  111.6 kg 














Results


Laboratory Results: 


                                        





                                 10/07/20 20:20 





                                 10/07/20 20:20 





                                        











  10/08/20





  05:38


 


TSH  1.30


 


Free T4  1.47








                                        











  10/07/20 10/07/20 10/07/20





  20:20 20:20 23:27


 


Creatine Kinase  23 L  


 


CK-MB (CK-2)   0.32 


 


Troponin I   < 0.012  < 0.012


 


NT-Pro-B Natriuret Pep   














  10/08/20 10/08/20 10/08/20





  05:38 05:38 14:47


 


Creatine Kinase  < 20 L   < 20 L


 


CK-MB (CK-2)   0.29 


 


Troponin I   < 0.012 


 


NT-Pro-B Natriuret Pep   635 H 














  10/08/20 10/08/20 10/08/20





  14:47 20:56 20:56


 


Creatine Kinase   < 20 L 


 


CK-MB (CK-2)  0.34   0.37


 


Troponin I  < 0.012   < 0.012


 


NT-Pro-B Natriuret Pep   











Impressions: 


                                        





Chest X-Ray  10/07/20 20:22


IMPRESSION:


 


No acute disease.


 


 


copyright 2011 [employer]- All Rights Reserved


 








KUB X-Ray  10/07/20 20:31


IMPRESSION: No acute intra-abdominal process is identified.


 








                                        





                                 10/07/20 20:20 





                                 10/07/20 20:20 





                                        











MCV  84 fl (80-97)   10/07/20  20:20    


 


MCH  28.4 pg (27.0-33.4)   10/07/20  20:20    


 


MCHC  33.8 g/dL (32.0-36.0)   10/07/20  20:20    


 


RDW  17.5 % (11.5-14.0)  H  10/07/20  20:20    


 


Seg Neutrophils %  46.4 % (42-78)   10/07/20  20:20    


 


Chloride  98 mmol/L ()   10/07/20  20:20    


 


Carbon Dioxide  28 mmol/L (22-30)   10/07/20  20:20    


 


Anion Gap  12  (5-19)   10/07/20  20:20    


 


Est GFR ( Amer)  > 60  (>60)   10/07/20  20:20    


 


Glucose  132 mg/dL ()  H  10/07/20  20:20    


 


Calcium  9.4 mg/dL (8.4-10.2)   10/07/20  20:20    


 


Magnesium  1.6 mg/dL (1.6-2.3)   10/08/20  05:38    


 


Total Bilirubin  0.4 mg/dL (0.2-1.3)   10/07/20  20:20    


 


AST  27 U/L (14-36)   10/07/20  20:20    


 


Alkaline Phosphatase  95 U/L ()   10/07/20  20:20    


 


Total Protein  7.1 g/dL (6.3-8.2)   10/07/20  20:20    


 


Albumin  3.6 g/dL (3.5-5.0)   10/07/20  20:20    


 


TSH  1.30 uIU/mL (0.47-4.68)   10/08/20  05:38    


 


Free T4  1.47 ng/dL (0.78-2.19)   10/08/20  05:38    


 


Urine Color  YELLOW   10/07/20  22:44    


 


Urine Appearance  SLIGHTLY-CLOUDY   10/07/20  22:44    


 


Urine pH  5.0  (5.0-9.0)   10/07/20  22:44    


 


Ur Specific Gravity  1.016   10/07/20  22:44    


 


Urine Protein  NEGATIVE mg/dL (NEGATIVE)   10/07/20  22:44    


 


Urine Glucose (UA)  NEGATIVE mg/dL (NEGATIVE)   10/07/20  22:44    


 


Urine Ketones  NEGATIVE mg/dL (NEGATIVE)   10/07/20  22:44    


 


Urine Blood  NEGATIVE  (NEGATIVE)   10/07/20  22:44    


 


Urine Nitrite  NEGATIVE  (NEGATIVE)   10/07/20  22:44    


 


Ur Leukocyte Esterase  NEGATIVE  (NEGATIVE)   10/07/20  22:44    


 


Urine WBC (Auto)  2 /HPF  10/07/20  22:44    


 


Urine RBC (Auto)  1 /HPF  10/07/20  22:44    








                                        











  10/07/20 10/07/20 10/07/20





  20:20 20:20 23:27


 


Creatine Kinase  23 L  


 


CK-MB (CK-2)   0.32 


 


Troponin I   < 0.012  < 0.012


 


NT-Pro-B Natriuret Pep   














  10/08/20 10/08/20 10/08/20





  05:38 05:38 14:47


 


Creatine Kinase  < 20 L   < 20 L


 


CK-MB (CK-2)   0.29 


 


Troponin I   < 0.012 


 


NT-Pro-B Natriuret Pep   635 H 














  10/08/20 10/08/20 10/08/20





  14:47 20:56 20:56


 


Creatine Kinase   < 20 L 


 


CK-MB (CK-2)  0.34   0.37


 


Troponin I  < 0.012   < 0.012


 


NT-Pro-B Natriuret Pep   








                             Current Medication List











Generic Name Dose Route Start Last Admin





  Trade Name Freq  PRN Reason Stop Dose Admin


 


Acetaminophen  650 mg  10/09/20 00:41  10/09/20 00:59





  Tylenol 325 Mg Tablet  PO  11/08/20 00:40  650 mg





  Q4HP PRN   Administration





  PAIN  


 


Albuterol  2 puff  10/09/20 10:00 





  Ventolin Hfa 8 Gm Mdi  IH  11/08/20 09:59 





  TID CHAPITO  


 


Aspirin  325 mg  10/08/20 11:00  10/08/20 12:55





  Aspirin 325 Mg Tablet  PO  11/07/20 10:59  325 mg





  DAILY CHAPITO   Administration


 


Enoxaparin Sodium  40 mg  10/08/20 10:00  10/08/20 10:12





  Lovenox Inj 40 Mg/0.4 Ml Disp.Syrin  SUBCUT  11/07/20 09:59  40 mg





  DAILY CHAPITO   Administration


 


Metoprolol Tartrate  50 mg  10/08/20 18:00  10/09/20 00:06





  Lopressor 50 Mg Tablet  PO  11/07/20 17:59  50 mg





  Q6 CHAPITO   Administration


 


Pantoprazole Sodium  40 mg  10/09/20 08:00 





  Protonix 40 Mg Dr Tablet  PO  11/08/20 07:59 





  QAM CHAPITO  














Discontinued Medications














Generic Name Dose Route Start Last Admin





  Trade Name Freq  PRN Reason Stop Dose Admin


 


Aspirin  324 mg  10/07/20 20:35  10/07/20 20:46





  Aspirin 81 Mg Chewable Tablet  PO  10/07/20 20:36  324 mg





  NOW ONE   Administration


 


Diltiazem HCl  5 mg  10/07/20 20:32  10/07/20 20:47





  Cardizem Inj 25 Mg/5 Ml Vial  IV  10/07/20 20:33  5 mg





  NOW ONE   Administration


 


Diltiazem HCl  180 mg  10/07/20 22:44  10/07/20 22:51





  Cardizem Cd 180 Mg Capsule  PO  10/07/20 22:45  Not Given





  NOW ONE  


 


Diltiazem HCl  10 mg  10/07/20 22:49  10/07/20 23:01





  Cardizem Inj 25 Mg/5 Ml Vial  IV  10/07/20 22:50  10 mg





  NOW ONE   Administration


 


Enoxaparin Sodium  120 mg  10/07/20 20:34  10/07/20 20:49





  Lovenox Inj 120 Mg/0.8 Ml Disp.Syrin  SUBCUT  10/07/20 20:35  120 mg





  NOW ONE   Administration


 


Sodium Chloride  1,000 mls @ 0 mls/hr  10/07/20 20:31  10/08/20 00:16





  Nacl 0.9% 1000 Ml Iv Soln  IV  10/07/20 20:32  Infused





  BOLUS ONE   Infusion





  Wide Open  


 


Diltiazem HCl  125 mg in 125 mls @ 0 mls/hr  10/08/20 04:39  10/08/20 14:42





  Cardizem Rtu Inj 125 Mg-D5w 125 Ml Premix  IV  11/07/20 04:38  Infused





  CONTINUOUS PRN   Titration





  THIS MED IS NOT "PRN"  





  Protocol  





  Titrate  


 


Diltiazem HCl  125 mg in 125 mls @ 0 mls/hr  10/08/20 12:53  10/08/20 14:42





  Cardizem Rtu Inj 125 Mg-D5w 125 Ml Premix  IV  10/08/20 23:59  0 mg/hr





  CONTINUOUS PRN   0 mls/hr





  THIS MED IS NOT "PRN"   Titration





  Protocol  





  Titrate  


 


Metoprolol Tartrate  50 mg  10/08/20 11:00  10/08/20 12:55





  Lopressor 50 Mg Tablet  PO  11/07/20 10:59  50 mg





  Q12 CHAPITO   Administration


 


Oxycodone/Acetaminophen  1 tab  10/07/20 23:09  10/07/20 23:32





  Percocet 5-325 Mg Tablet  PO  10/07/20 23:10  1 tab





  NOW ONE   Administration


 


Simethicone  160 mg  10/07/20 21:32  10/07/20 21:53





  Mylicon 80 Mg Chewable Tablet  PO  10/07/20 21:33  160 mg





  NOW ONE   Administration








                                        





                                 10/09/20 06:08 





                                 10/09/20 06:08 





                                        











MCV  84 fl (80-97)   10/09/20  06:08    


 


MCH  28.4 pg (27.0-33.4)   10/09/20  06:08    


 


MCHC  33.9 g/dL (32.0-36.0)   10/09/20  06:08    


 


RDW  17.4 % (11.5-14.0)  H  10/09/20  06:08    


 


Seg Neutrophils %  50.6 % (42-78)   10/09/20  06:08    


 


Chloride  100 mmol/L ()   10/09/20  06:08    


 


Chloride  Cancelled   10/09/20  06:08    


 


Carbon Dioxide  30 mmol/L (22-30)   10/09/20  06:08    


 


Carbon Dioxide  Cancelled   10/09/20  06:08    


 


Anion Gap  9  (5-19)   10/09/20  06:08    


 


Anion Gap  Cancelled   10/09/20  06:08    


 


Est GFR ( Amer)  > 60  (>60)   10/09/20  06:08    


 


Est GFR ( Amer)  Cancelled   10/09/20  06:08    


 


Est GFR (Non-Af Amer)  Cancelled   10/09/20  06:08    


 


Glucose  114 mg/dL ()  H  10/09/20  06:08    


 


Glucose  Cancelled   10/09/20  06:08    


 


Calcium  9.6 mg/dL (8.4-10.2)   10/09/20  06:08    


 


Calcium  Cancelled   10/09/20  06:08    


 


Magnesium  1.6 mg/dL (1.6-2.3)   10/08/20  05:38    


 


Total Bilirubin  0.5 mg/dL (0.2-1.3)   10/09/20  06:08    


 


AST  24 U/L (14-36)   10/09/20  06:08    


 


Alkaline Phosphatase  92 U/L ()   10/09/20  06:08    


 


Total Protein  6.7 g/dL (6.3-8.2)   10/09/20  06:08    


 


Albumin  3.3 g/dL (3.5-5.0)  L  10/09/20  06:08    


 


Triglycerides  173 mg/dL (<150)  H  10/09/20  06:08    


 


Cholesterol  139.85 mg/dL (0-200)   10/09/20  06:08    


 


LDL Cholesterol Direct  77 mg/dL (<100)   10/09/20  06:08    


 


VLDL Cholesterol  34.6 mg/dL (10-31)  H  10/09/20  06:08    


 


HDL Cholesterol  31 mg/dL (>40)  L  10/09/20  06:08    


 


TSH  1.30 uIU/mL (0.47-4.68)   10/08/20  05:38    


 


Free T4  1.47 ng/dL (0.78-2.19)   10/08/20  05:38    


 


Urine Color  YELLOW   10/07/20  22:44    


 


Urine Appearance  SLIGHTLY-CLOUDY   10/07/20  22:44    


 


Urine pH  5.0  (5.0-9.0)   10/07/20  22:44    


 


Ur Specific Gravity  1.016   10/07/20  22:44    


 


Urine Protein  NEGATIVE mg/dL (NEGATIVE)   10/07/20  22:44    


 


Urine Glucose (UA)  NEGATIVE mg/dL (NEGATIVE)   10/07/20  22:44    


 


Urine Ketones  NEGATIVE mg/dL (NEGATIVE)   10/07/20  22:44    


 


Urine Blood  NEGATIVE  (NEGATIVE)   10/07/20  22:44    


 


Urine Nitrite  NEGATIVE  (NEGATIVE)   10/07/20  22:44    


 


Ur Leukocyte Esterase  NEGATIVE  (NEGATIVE)   10/07/20  22:44    


 


Urine WBC (Auto)  2 /HPF  10/07/20  22:44    


 


Urine RBC (Auto)  1 /HPF  10/07/20  22:44    








                                        





10/07/20 22:44   Clean Catch Midstream   Urine Culture - Final


                            Mixed Skin. Possible Pathogen





                                        











  10/07/20 10/07/20 10/07/20





  20:20 20:20 23:27


 


Creatine Kinase  23 L  


 


CK-MB (CK-2)   0.32 


 


Troponin I   < 0.012  < 0.012


 


NT-Pro-B Natriuret Pep   














  10/08/20 10/08/20 10/08/20





  05:38 05:38 14:47


 


Creatine Kinase  < 20 L   < 20 L


 


CK-MB (CK-2)   0.29 


 


Troponin I   < 0.012 


 


NT-Pro-B Natriuret Pep   635 H 














  10/08/20 10/08/20 10/08/20





  14:47 20:56 20:56


 


Creatine Kinase   < 20 L 


 


CK-MB (CK-2)  0.34   0.37


 


Troponin I  < 0.012   < 0.012


 


NT-Pro-B Natriuret Pep   














  10/09/20





  06:08


 


Creatine Kinase 


 


CK-MB (CK-2) 


 


Troponin I 


 


NT-Pro-B Natriuret Pep  604 H














Assessment & Plan





- Diagnosis


(1) Atrial fibrillation


Qualifiers: 


   Atrial fibrillation type: unspecified   Qualified Code(s): I48.91 - 

Unspecified atrial fibrillation   


Is this a current diagnosis for this admission?: Yes   


Plan: 


Her heart rate is improved that after receiving 3 doses of Lopressor 50 mg p.o. 

She continues to be asymptomatic with a significantly improved ventricular 

response.  She is currently not anticoagulated as, in her last evaluated in 

September 2020, she was not cleared for full anticoagulation.





Recommendations:


-Continue with Lopressor 50 mg p.o. every 6 hours.


-Hold anticoagulation until cleared by neurology.


-We will consider outpatient ischemic assessment once the patient is improved.








(2) Elevated brain natriuretic peptide (BNP) level


Plan: 


Likely secondary to her atrial fibrillation.  There is no clinical or physical 

exam evidence of heart failure.  The patient is resting comfortably on her back 

without dyspnea.  Her chest x-ray did not show evidence of heart failure.





Recommendations:


-Continue to follow clinically.

## 2020-10-10 NOTE — PDOC PROGRESS REPORT
Subjective


Progress Note for:: 10/10/20


Subjective:: 


SHUN SUAREZ is a 65 year old female with history of stroke on 09/21/2020 

secondary to right M1 occlusion initially treated with IV TPA followed by 

mechanical thrombectomy, complicated by cerebral edema and midline shift which 

was treated with decompressive hemicraniectomy on 09/22/2020, hypertension, 

hyperlipidemia, diabetes, asthma, arthritis, seasonal allergies, tobacco use but

quit 5 months ago, paroxysmal atrial fibrillation who is consulted to our 

service for further evaluation and treatment of atrial fibrillation.  The 

patient came to the emergency room by ambulance complaining of chest pain after 

eating supper.  She describes her pain as sharp in nature, substernal in 

location, lasting approximately 10 minutes, with radiation to the epigastrium 

and she thought it was from reflux disease, associated with shortness of breath 

and without diaphoresis, palpitations, syncope or presyncope.  In the emergency 

room she was found to be in rapid atrial fibrillation and was started on a 

Cardizem drip.  This morning she continues to be at her baseline and denies new 

cardiac complaints.  She denies recurrence of chest pain.  Unfortunately she 

continues to be in rapid atrial fibrillation.





10/10/2020:


The patient had an uneventful night from the cardiovascular standpoint however 

she is now unable to swallow for unclear reasons to me.  Her blood pressure 

continues to be at goal.  Her telemetry shows atrial fibrillation with a 

significantly improved heart rate however there are now episodes of severe 

bradycardia as well as transient episodes of rapid ventricular response 

consistent with sick sinus syndrome.





Physical exam on 10/10/2020:


GENERAL: Oriented x3 with normal mood.  Not in acute distress.  Well groomed and

well developed.  The patient is noted to have her medications in her mouth but 

unable to swallow.


HEENT:  Normocephalic, atraumatic.  Pupils equal.   Sclerae anicteric.  

Oropharynx moist. 


NECK:  No JVD.  No carotid bruits. 


LUNGS:  Clear to auscultation bilaterally.  Normal respiratory effort without 

the use of accessory muscles or intercostal retractions.  


CARDIOVASCULAR: Irregularly irregular rate and rhythm without murmurs, rubs, or 

gallops.  PMI not displaced.


ABDOMEN: No masses or tenderness to palpation.  No bruit.  No splenomegaly or 

hepatomegaly. No abdominal aorta bruit noted.


EXTREMITIES: Trace pitting edema bilaterally, no cyanosis, no clubbing.  +2 

pulses femoral and pedal pulses bilaterally.  


SKIN: No lesions or rashes.


MUSCULOSKELETAL:  No chest tenderness to palpation.





Cardiac studies:


Echocardiogram on 10/08/2020:


-EF 60 to 65%.


-No wall motion abnormalities.


-Possible inlet VSD.


-Mild MR, mild AI, mild TR, mild PI.





Reason For Visit: 


ATRIAL FIBRILLATION WITH RAPID VENTRICULAR RESPONS








Physical Exam


Vital Signs: 


                                        











Temp Pulse Resp BP Pulse Ox


 


 98.2 F   111 H  18   96/56 L  94 


 


 10/10/20 04:04  10/10/20 04:04  10/10/20 04:04  10/10/20 04:04  10/10/20 04:04








                                 Intake & Output











 10/08/20 10/09/20 10/10/20





 06:59 06:59 06:59


 


Intake Total 1003 1219 1266


 


Balance 1003 1219 1266


 


Weight 111.6 kg 116.1 kg 














Results


Laboratory Results: 


                                        





                                 10/09/20 06:08 





                                 10/09/20 06:08 





                                        











  10/09/20 10/09/20 10/09/20





  06:08 06:08 06:08


 


WBC  6.7  


 


RBC  4.13  


 


Hgb  11.7 L  


 


Hct  34.6 L  


 


MCV  84  


 


MCH  28.4  


 


MCHC  33.9  


 


RDW  17.4 H  


 


Plt Count  286  


 


Seg Neutrophils %  50.6  


 


Sodium   138.6  Cancelled


 


Potassium   3.5 L  Cancelled


 


Chloride   100  Cancelled


 


Carbon Dioxide   30  Cancelled


 


Anion Gap   9  Cancelled


 


BUN   11  Cancelled


 


Creatinine   0.64  Cancelled


 


Est GFR ( Amer)   > 60  Cancelled


 


Est GFR (Non-Af Amer)    Cancelled


 


Glucose   114 H  Cancelled


 


Calcium   9.6  Cancelled


 


Total Bilirubin   0.5 


 


AST   24 


 


Alkaline Phosphatase   92 


 


Total Protein   6.7 


 


Albumin   3.3 L 


 


Triglycerides   173 H 


 


Cholesterol   139.85 


 


LDL Cholesterol Direct   77 


 


VLDL Cholesterol   34.6 H 


 


HDL Cholesterol   31 L 








                                        





10/07/20 22:44   Clean Catch Midstream   Urine Culture - Final


                            Mixed Skin. Possible Pathogen





                                        











  10/07/20 10/07/20 10/07/20





  20:20 20:20 23:27


 


Creatine Kinase  23 L  


 


CK-MB (CK-2)   0.32 


 


Troponin I   < 0.012  < 0.012


 


NT-Pro-B Natriuret Pep   














  10/08/20 10/08/20 10/08/20





  05:38 05:38 14:47


 


Creatine Kinase  < 20 L   < 20 L


 


CK-MB (CK-2)   0.29 


 


Troponin I   < 0.012 


 


NT-Pro-B Natriuret Pep   635 H 














  10/08/20 10/08/20 10/08/20





  14:47 20:56 20:56


 


Creatine Kinase   < 20 L 


 


CK-MB (CK-2)  0.34   0.37


 


Troponin I  < 0.012   < 0.012


 


NT-Pro-B Natriuret Pep   














  10/09/20





  06:08


 


Creatine Kinase 


 


CK-MB (CK-2) 


 


Troponin I 


 


NT-Pro-B Natriuret Pep  604 H











Impressions: 


                                        





Chest X-Ray  10/07/20 20:22


IMPRESSION:


 


No acute disease.


 


 


copyright 2011 [employer]- All Rights Reserved


 








KUB X-Ray  10/07/20 20:31


IMPRESSION: No acute intra-abdominal process is identified.


 








                                        





                                 10/09/20 06:08 





                                 10/09/20 06:08 





                                        











MCV  84 fl (80-97)   10/09/20  06:08    


 


MCH  28.4 pg (27.0-33.4)   10/09/20  06:08    


 


MCHC  33.9 g/dL (32.0-36.0)   10/09/20  06:08    


 


RDW  17.4 % (11.5-14.0)  H  10/09/20  06:08    


 


Seg Neutrophils %  50.6 % (42-78)   10/09/20  06:08    


 


Chloride  100 mmol/L ()   10/09/20  06:08    


 


Chloride  Cancelled   10/09/20  06:08    


 


Carbon Dioxide  30 mmol/L (22-30)   10/09/20  06:08    


 


Carbon Dioxide  Cancelled   10/09/20  06:08    


 


Anion Gap  9  (5-19)   10/09/20  06:08    


 


Anion Gap  Cancelled   10/09/20  06:08    


 


Est GFR ( Amer)  > 60  (>60)   10/09/20  06:08    


 


Est GFR ( Amer)  Cancelled   10/09/20  06:08    


 


Est GFR (Non-Af Amer)  Cancelled   10/09/20  06:08    


 


Glucose  114 mg/dL ()  H  10/09/20  06:08    


 


Glucose  Cancelled   10/09/20  06:08    


 


Calcium  9.6 mg/dL (8.4-10.2)   10/09/20  06:08    


 


Calcium  Cancelled   10/09/20  06:08    


 


Magnesium  1.6 mg/dL (1.6-2.3)   10/08/20  05:38    


 


Total Bilirubin  0.5 mg/dL (0.2-1.3)   10/09/20  06:08    


 


AST  24 U/L (14-36)   10/09/20  06:08    


 


Alkaline Phosphatase  92 U/L ()   10/09/20  06:08    


 


Total Protein  6.7 g/dL (6.3-8.2)   10/09/20  06:08    


 


Albumin  3.3 g/dL (3.5-5.0)  L  10/09/20  06:08    


 


Triglycerides  173 mg/dL (<150)  H  10/09/20  06:08    


 


Cholesterol  139.85 mg/dL (0-200)   10/09/20  06:08    


 


LDL Cholesterol Direct  77 mg/dL (<100)   10/09/20  06:08    


 


VLDL Cholesterol  34.6 mg/dL (10-31)  H  10/09/20  06:08    


 


HDL Cholesterol  31 mg/dL (>40)  L  10/09/20  06:08    


 


TSH  1.30 uIU/mL (0.47-4.68)   10/08/20  05:38    


 


Free T4  1.47 ng/dL (0.78-2.19)   10/08/20  05:38    


 


Urine Color  YELLOW   10/07/20  22:44    


 


Urine Appearance  SLIGHTLY-CLOUDY   10/07/20  22:44    


 


Urine pH  5.0  (5.0-9.0)   10/07/20  22:44    


 


Ur Specific Gravity  1.016   10/07/20  22:44    


 


Urine Protein  NEGATIVE mg/dL (NEGATIVE)   10/07/20  22:44    


 


Urine Glucose (UA)  NEGATIVE mg/dL (NEGATIVE)   10/07/20  22:44    


 


Urine Ketones  NEGATIVE mg/dL (NEGATIVE)   10/07/20  22:44    


 


Urine Blood  NEGATIVE  (NEGATIVE)   10/07/20  22:44    


 


Urine Nitrite  NEGATIVE  (NEGATIVE)   10/07/20  22:44    


 


Ur Leukocyte Esterase  NEGATIVE  (NEGATIVE)   10/07/20  22:44    


 


Urine WBC (Auto)  2 /HPF  10/07/20  22:44    


 


Urine RBC (Auto)  1 /HPF  10/07/20  22:44    








                                        





10/07/20 22:44   Clean Catch Midstream   Urine Culture - Final


                            Mixed Skin. Possible Pathogen





                                        











  10/07/20 10/07/20 10/07/20





  20:20 20:20 23:27


 


Creatine Kinase  23 L  


 


CK-MB (CK-2)   0.32 


 


Troponin I   < 0.012  < 0.012


 


NT-Pro-B Natriuret Pep   














  10/08/20 10/08/20 10/08/20





  05:38 05:38 14:47


 


Creatine Kinase  < 20 L   < 20 L


 


CK-MB (CK-2)   0.29 


 


Troponin I   < 0.012 


 


NT-Pro-B Natriuret Pep   635 H 














  10/08/20 10/08/20 10/08/20





  14:47 20:56 20:56


 


Creatine Kinase   < 20 L 


 


CK-MB (CK-2)  0.34   0.37


 


Troponin I  < 0.012   < 0.012


 


NT-Pro-B Natriuret Pep   














  10/09/20





  06:08


 


Creatine Kinase 


 


CK-MB (CK-2) 


 


Troponin I 


 


NT-Pro-B Natriuret Pep  604 H








                             Current Medication List











Generic Name Dose Route Start Last Admin





  Trade Name Freq  PRN Reason Stop Dose Admin


 


Acetaminophen  650 mg  10/09/20 00:41  10/09/20 09:49





  Tylenol 325 Mg Tablet  PO  11/08/20 00:40  650 mg





  Q4HP PRN   Administration





  PAIN  


 


Albuterol  2 puff  10/09/20 10:00  10/09/20 18:13





  Ventolin Hfa 8 Gm Mdi  IH  11/08/20 09:59  2 puff





  TID CHAPITO   Administration


 


Aspirin  325 mg  10/08/20 11:00  10/09/20 09:45





  Aspirin 325 Mg Tablet  PO  11/07/20 10:59  325 mg





  DAILY CHAPITO   Administration


 


Atorvastatin Calcium  10 mg  10/09/20 22:00  10/09/20 22:01





  Lipitor 10 Mg Tablet  PO  11/08/20 21:59  10 mg





  QHS CHAPITO   Administration


 


Cephalexin HCl  250 mg  10/10/20 00:00  10/10/20 01:36





  Keflex 250 Mg Capsule  PO  10/17/20 00:00  250 mg





  Q6 CHAPITO   Administration


 


Enoxaparin Sodium  40 mg  10/08/20 10:00  10/09/20 09:45





  Lovenox Inj 40 Mg/0.4 Ml Disp.Syrin  SUBCUT  11/07/20 09:59  40 mg





  DAILY CHAPITO   Administration


 


Hydrochlorothiazide  12.5 mg  10/09/20 22:00  10/09/20 22:01





  Hydrodiuril 12.5 Mg Tablet  PO  11/08/20 21:59  12.5 mg





  DAILY CHAPITO   Administration


 


Metoprolol Tartrate  50 mg  10/08/20 18:00  10/10/20 01:36





  Lopressor 50 Mg Tablet  PO  11/07/20 17:59  50 mg





  Q6 CHAPITO   Administration


 


Montelukast Sodium  10 mg  10/09/20 22:00  10/09/20 22:01





  Singulair 10 Mg Tablet  PO  11/08/20 21:59  10 mg





  QHS CHAPITO   Administration


 


Pantoprazole Sodium  40 mg  10/09/20 08:00  10/09/20 09:45





  Protonix 40 Mg Dr Tablet  PO  11/08/20 07:59  40 mg





  QAM CHAPITO   Administration


 


Polyethylene Glycol  17 gm  10/09/20 22:00  10/09/20 22:01





  Miralax Powder 17 Gm/Packet  PO  11/08/20 21:59  17 gm





  DAILY CHAPITO   Administration


 


Senna/Docusate Sodium  1 each  10/09/20 21:30  10/09/20 22:01





  Senna Plus Tablet  PO  11/08/20 21:29  1 each





  BID CHAPITO   Administration


 


Sucralfate  1 gm  10/09/20 22:00  10/09/20 22:01





  Carafate 1 Gm Tablet  PO  11/08/20 21:59  1 gm





  QID CHAPITO   Administration


 


Venlafaxine HCl  75 mg  10/09/20 22:00  10/09/20 22:01





  Effexor Xr 75 Mg Cap.Sr  PO  11/08/20 21:59  75 mg





  DAILY CHAPITO   Administration














Discontinued Medications














Generic Name Dose Route Start Last Admin





  Trade Name Freq  PRN Reason Stop Dose Admin


 


Acetaminophen  650 mg  10/09/20 21:15 





  Tylenol 325 Mg Tablet  PO  11/08/20 21:14 





  Q4HP PRN  





  FOR PAIN  


 


Albuterol  2 puff  10/09/20 21:15  10/10/20 01:44





  Ventolin Hfa 8 Gm Mdi  IH  11/08/20 21:14  Not Given





  TID CHAPITO  


 


Aspirin  324 mg  10/07/20 20:35  10/07/20 20:46





  Aspirin 81 Mg Chewable Tablet  PO  10/07/20 20:36  324 mg





  NOW ONE   Administration


 


Aspirin  325 mg  10/09/20 21:15  10/10/20 01:44





  Aspirin 325 Mg Tablet  PO  11/08/20 21:14  Not Given





  DAILY CHAPITO  


 


Diltiazem HCl  5 mg  10/07/20 20:32  10/07/20 20:47





  Cardizem Inj 25 Mg/5 Ml Vial  IV  10/07/20 20:33  5 mg





  NOW ONE   Administration


 


Diltiazem HCl  180 mg  10/07/20 22:44  10/07/20 22:51





  Cardizem Cd 180 Mg Capsule  PO  10/07/20 22:45  Not Given





  NOW ONE  


 


Diltiazem HCl  10 mg  10/07/20 22:49  10/07/20 23:01





  Cardizem Inj 25 Mg/5 Ml Vial  IV  10/07/20 22:50  10 mg





  NOW ONE   Administration


 


Enoxaparin Sodium  120 mg  10/07/20 20:34  10/07/20 20:49





  Lovenox Inj 120 Mg/0.8 Ml Disp.Syrin  SUBCUT  10/07/20 20:35  120 mg





  NOW ONE   Administration


 


Sodium Chloride  1,000 mls @ 0 mls/hr  10/07/20 20:31  10/08/20 00:16





  Nacl 0.9% 1000 Ml Iv Soln  IV  10/07/20 20:32  Infused





  BOLUS ONE   Infusion





  Wide Open  


 


Diltiazem HCl  125 mg in 125 mls @ 0 mls/hr  10/08/20 04:39  10/08/20 14:42





  Cardizem Rtu Inj 125 Mg-D5w 125 Ml Premix  IV  11/07/20 04:38  Infused





  CONTINUOUS PRN   Titration





  THIS MED IS NOT "PRN"  





  Protocol  





  Titrate  


 


Diltiazem HCl  125 mg in 125 mls @ 0 mls/hr  10/08/20 12:53  10/08/20 14:42





  Cardizem Rtu Inj 125 Mg-D5w 125 Ml Premix  IV  10/08/20 23:59  0 mg/hr





  CONTINUOUS PRN   0 mls/hr





  THIS MED IS NOT "PRN"   Titration





  Protocol  





  Titrate  


 


Metoprolol Tartrate  50 mg  10/08/20 11:00  10/08/20 12:55





  Lopressor 50 Mg Tablet  PO  11/07/20 10:59  50 mg





  Q12 CHAPITO   Administration


 


Metoprolol Tartrate  25 mg  10/09/20 21:30 





  Lopressor 25 Mg Tablet  PO  11/08/20 21:29 





  BID CHAPITO  


 


Oxycodone/Acetaminophen  1 tab  10/07/20 23:09  10/07/20 23:32





  Percocet 5-325 Mg Tablet  PO  10/07/20 23:10  1 tab





  NOW ONE   Administration


 


Pantoprazole Sodium  40 mg  10/10/20 08:00 





  Protonix 40 Mg Dr Tablet  PO  11/09/20 07:59 





  QAM CHAPITO  


 


Simethicone  160 mg  10/07/20 21:32  10/07/20 21:53





  Mylicon 80 Mg Chewable Tablet  PO  10/07/20 21:33  160 mg





  NOW ONE   Administration








                                        





                                 10/10/20 06:20 





                                 10/09/20 06:08 





                                        











MCV  83 fl (80-97)   10/10/20  06:20    


 


MCH  28.3 pg (27.0-33.4)   10/10/20  06:20    


 


MCHC  33.9 g/dL (32.0-36.0)   10/10/20  06:20    


 


RDW  17.2 % (11.5-14.0)  H  10/10/20  06:20    


 


Seg Neutrophils %  54.5 % (42-78)   10/10/20  06:20    


 


Chloride  100 mmol/L ()   10/09/20  06:08    


 


Chloride  Cancelled   10/09/20  06:08    


 


Carbon Dioxide  30 mmol/L (22-30)   10/09/20  06:08    


 


Carbon Dioxide  Cancelled   10/09/20  06:08    


 


Anion Gap  9  (5-19)   10/09/20  06:08    


 


Anion Gap  Cancelled   10/09/20  06:08    


 


Est GFR ( Amer)  > 60  (>60)   10/09/20  06:08    


 


Est GFR ( Amer)  Cancelled   10/09/20  06:08    


 


Est GFR (Non-Af Amer)  Cancelled   10/09/20  06:08    


 


Glucose  114 mg/dL ()  H  10/09/20  06:08    


 


Glucose  Cancelled   10/09/20  06:08    


 


Calcium  9.6 mg/dL (8.4-10.2)   10/09/20  06:08    


 


Calcium  Cancelled   10/09/20  06:08    


 


Magnesium  1.6 mg/dL (1.6-2.3)   10/08/20  05:38    


 


Total Bilirubin  0.5 mg/dL (0.2-1.3)   10/09/20  06:08    


 


AST  24 U/L (14-36)   10/09/20  06:08    


 


Alkaline Phosphatase  92 U/L ()   10/09/20  06:08    


 


Total Protein  6.7 g/dL (6.3-8.2)   10/09/20  06:08    


 


Albumin  3.3 g/dL (3.5-5.0)  L  10/09/20  06:08    


 


Triglycerides  173 mg/dL (<150)  H  10/09/20  06:08    


 


Cholesterol  139.85 mg/dL (0-200)   10/09/20  06:08    


 


LDL Cholesterol Direct  77 mg/dL (<100)   10/09/20  06:08    


 


VLDL Cholesterol  34.6 mg/dL (10-31)  H  10/09/20  06:08    


 


HDL Cholesterol  31 mg/dL (>40)  L  10/09/20  06:08    


 


TSH  1.30 uIU/mL (0.47-4.68)   10/08/20  05:38    


 


Free T4  1.47 ng/dL (0.78-2.19)   10/08/20  05:38    


 


Urine Color  YELLOW   10/07/20  22:44    


 


Urine Appearance  SLIGHTLY-CLOUDY   10/07/20  22:44    


 


Urine pH  5.0  (5.0-9.0)   10/07/20  22:44    


 


Ur Specific Gravity  1.016   10/07/20  22:44    


 


Urine Protein  NEGATIVE mg/dL (NEGATIVE)   10/07/20  22:44    


 


Urine Glucose (UA)  NEGATIVE mg/dL (NEGATIVE)   10/07/20  22:44    


 


Urine Ketones  NEGATIVE mg/dL (NEGATIVE)   10/07/20  22:44    


 


Urine Blood  NEGATIVE  (NEGATIVE)   10/07/20  22:44    


 


Urine Nitrite  NEGATIVE  (NEGATIVE)   10/07/20  22:44    


 


Ur Leukocyte Esterase  NEGATIVE  (NEGATIVE)   10/07/20  22:44    


 


Urine WBC (Auto)  2 /HPF  10/07/20  22:44    


 


Urine RBC (Auto)  1 /HPF  10/07/20  22:44    








                                        





10/07/20 22:44   Clean Catch Midstream   Urine Culture - Final


                            Mixed Skin. Possible Pathogen





                                        











  10/07/20 10/07/20 10/07/20





  20:20 20:20 23:27


 


Creatine Kinase  23 L  


 


CK-MB (CK-2)   0.32 


 


Troponin I   < 0.012  < 0.012


 


NT-Pro-B Natriuret Pep   














  10/08/20 10/08/20 10/08/20





  05:38 05:38 14:47


 


Creatine Kinase  < 20 L   < 20 L


 


CK-MB (CK-2)   0.29 


 


Troponin I   < 0.012 


 


NT-Pro-B Natriuret Pep   635 H 














  10/08/20 10/08/20 10/08/20





  14:47 20:56 20:56


 


Creatine Kinase   < 20 L 


 


CK-MB (CK-2)  0.34   0.37


 


Troponin I  < 0.012   < 0.012


 


NT-Pro-B Natriuret Pep   














  10/09/20





  06:08


 


Creatine Kinase 


 


CK-MB (CK-2) 


 


Troponin I 


 


NT-Pro-B Natriuret Pep  604 H














Assessment & Plan





- Diagnosis


(1) Atrial fibrillation


Qualifiers: 


   Atrial fibrillation type: unspecified   Qualified Code(s): I48.91 - 

Unspecified atrial fibrillation   


Is this a current diagnosis for this admission?: Yes   


Plan: 


Her heart rate is significantly improved however she is noted to have 

intermittent bradycardia in the 40s with episodes of rapid ventricular response 

in the 180s consistent with sick sinus syndrome.  The patient is noted to not 

been able to swallow any of her medicines this morning which is a new finding 

for her.  Given her episodes of bradycardia and suspected sick sinus syndrome I 

will decrease her Lopressor to 50 mg twice daily and we will continue to follow 

her.  She is currently not anticoagulated as, in her last evaluated in September 2020, she was not cleared for full anticoagulation.  At this point the patient 

is deemed not a good candidate for further cardiovascular invasive procedures.





Recommendations:


-Change Lopressor to 50 mg twice daily.


-Hold anticoagulation until cleared by neurology, primary hospitalist to contact

her neurologist to address this issue.


-We will continue to follow with you.








(2) Elevated brain natriuretic peptide (BNP) level


Plan: 


Likely secondary to her atrial fibrillation.  There is no clinical or physical 

exam evidence of heart failure.  The patient is resting comfortably on her back 

without dyspnea.  Her chest x-ray did not show evidence of heart failure.





Recommendations:


-Continue to follow clinically.








(3) Rule out sick sinus syndrome


Is this a current diagnosis for this admission?: Yes   


Plan: 


The patient is noted to have both episodes of significant bradycardia as well as

rapid ventricular response to her atrial fibrillation which is consistent with 

sick sinus syndrome.  She had remained asymptomatic however her bradycardia have

been moderate to severe at times therefore I will decrease her beta-blocker dose

by 50%.  At this point she is not yet a candidate for invasive cardiac 

procedures.








(4) Swallowing dysfunction


Is this a current diagnosis for this admission?: Yes   


Plan: 


The patient was noted to have difficulty swallowing her medications this morning

even when crushed in applesauce.  As [id number] she was unable to swallow 

anything this morning, this is actually a new finding.





Recommendations:


-Further evaluation and treatment per hospitalist team.

## 2020-10-10 NOTE — PDOC DISCHARGE SUMMARY
Impression





- Admit/DC Date/PCP


Admission Date/Primary Care Provider: 


  10/07/20 23:30





  





Discharge Date: 10/10/20





- Discharge Diagnosis


(1) Paroxysmal atrial fibrillation with rapid ventricular response


Is this a current diagnosis for this admission?: Yes   





(2) Chest pain at rest


Is this a current diagnosis for this admission?: Yes   





(3) Sick sinus syndrome


Is this a current diagnosis for this admission?: Yes   





- Additional Information


Home Medications: 








Acetaminophen [Tylenol 325 mg Tablet] 650 mg PO Q4HP PRN 10/08/20 


Albuterol Sulfate [Ventolin Hfa 8 gm Mdi] 2 puff IH TID 10/08/20 


Aspirin [Aspirin 325 mg Tablet] 325 mg PO DAILY 10/08/20 


Hydrochlorothiazide [Hydrodiuril 25 mg Tablet] 12.5 mg PO DAILY 10/08/20 


Metoprolol Tartrate [Lopressor 25 mg Tablet] 25 mg PO BID 10/08/20 


Montelukast Sodium [Singulair 10 mg Tablet] 10 mg PO QHS 10/08/20 


Pantoprazole Sodium [Protonix 40 mg Dr Tablet] 40 mg PO QAM 10/08/20 


Polyethylene Glycol 3350 [Miralax] 1 dose PO DAILY 10/08/20 


Pravastatin Sodium 40 mg PO QHS 10/08/20 


Sennosides/Docusate 8.6-50 mg [Senna Plus Tablet] 1 tab PO BID 10/08/20 


Sucralfate [Carafate 1 gm Tablet] 1 gm PO QID 10/08/20 


Venlafaxine HCl ER [Effexor Xr 75 mg Cap.sr] 75 mg PO DAILY 10/08/20 


Acetaminophen [Tylenol 325 mg Tablet] 650 mg PO Q4HP PRN  tablet 10/10/20 











History of Present Illiness


History of Present Illness: 


SHUN SUAREZ is a 65 year old female, She has a history of stroke on 

09/21/2020 she just relocated from [place] she had intravenous TPA, 

thrombectomy complicated by cerebral edema and midline shift this was treated 

subsequently with decompressive hemicraniotomy on 09/22/2020.  She came to the 

emergency room for evaluation of chest pain, palpitation, paroxysmal atrial 

fibrillation.








Hospital Course


Hospital Course: 


Patient was admitted for evaluation and management of atrial fibrillation, and 

chest pain, she was treated with intravenous Cardizem infusion for rate control,

she ruled out for acute MI with serial enzymes, she had episode of long pauses 

on telemetry strip, the combination of A. fib with long pauses suggesting sick 

sinus syndrome, she probably need a pacemaker.  She has atrial fibrillation, she

is not a candidate for long-term anticoagulation with Coumadin or the newer 

anticoagulant because she has a history of recent craniotomy due to cerebral 

edema following a stroke.  She supposed to return to [place] in December for 

replacement of the skull tissue.  She was seen by Dr. Moore cardiology, she 

be discharg home today





Physical Exam


Vital Signs: 


                                        











Temp Pulse Resp BP Pulse Ox


 


 98.3 F   82   16   100/79   99 


 


 10/10/20 08:10  10/10/20 08:10  10/10/20 08:10  10/10/20 08:10  10/10/20 08:10








                                 Intake & Output











 10/09/20 10/10/20 10/11/20





 06:59 06:59 06:59


 


Intake Total 1219 1366 


 


Balance 1219 1366 


 


Weight 116.1 kg 112.9 kg 











General appearance: PRESENT: no acute distress


Eye exam: PRESENT: PERRLA


Respiratory exam: PRESENT: clear to auscultation nando


Cardiovascular exam: PRESENT: +S1, +S2


GI/Abdominal exam: PRESENT: soft


Neurological exam: PRESENT: alert





Results


Laboratory Results: 


                                        











WBC  6.7 10^3/uL (4.0-10.5)   10/10/20  06:20    


 


RBC  4.07 10^6/uL (3.72-5.28)   10/10/20  06:20    


 


Hgb  11.5 g/dL (12.0-15.5)  L  10/10/20  06:20    


 


Hct  34.0 % (36.0-47.0)  L  10/10/20  06:20    


 


MCV  83 fl (80-97)   10/10/20  06:20    


 


MCH  28.3 pg (27.0-33.4)   10/10/20  06:20    


 


MCHC  33.9 g/dL (32.0-36.0)   10/10/20  06:20    


 


RDW  17.2 % (11.5-14.0)  H  10/10/20  06:20    


 


Plt Count  297 10^3/uL (150-450)   10/10/20  06:20    


 


Lymph % (Auto)  29.6 % (13-45)   10/10/20  06:20    


 


Mono % (Auto)  8.3 % (3-13)   10/10/20  06:20    


 


Eos % (Auto)  6.5 % (0-6)  H  10/10/20  06:20    


 


Baso % (Auto)  1.1 % (0-2)   10/10/20  06:20    


 


Absolute Neuts (auto)  3.7 10^3/uL (1.7-8.2)   10/10/20  06:20    


 


Absolute Lymphs (auto)  2.0 10^3/uL (0.5-4.7)   10/10/20  06:20    


 


Absolute Monos (auto)  0.6 10^3/uL (0.1-1.4)   10/10/20  06:20    


 


Absolute Eos (auto)  0.4 10^3/uL (0.0-0.6)   10/10/20  06:20    


 


Absolute Basos (auto)  0.1 10^3/uL (0.0-0.2)   10/10/20  06:20    


 


Seg Neutrophils %  54.5 % (42-78)   10/10/20  06:20    


 


PT  14.9 SEC (11.4-15.4)   10/08/20  05:38    


 


INR  1.15   10/08/20  05:38    


 


APTT  36.2 SEC (23.5-35.8)  H  10/08/20  05:38    


 


Sodium  138.6 mmol/L (137-145)   10/09/20  06:08    


 


Sodium  Cancelled   10/09/20  06:08    


 


Potassium  3.5 mmol/L (3.6-5.0)  L  10/09/20  06:08    


 


Potassium  Cancelled   10/09/20  06:08    


 


Chloride  100 mmol/L ()   10/09/20  06:08    


 


Chloride  Cancelled   10/09/20  06:08    


 


Carbon Dioxide  30 mmol/L (22-30)   10/09/20  06:08    


 


Carbon Dioxide  Cancelled   10/09/20  06:08    


 


Anion Gap  9  (5-19)   10/09/20  06:08    


 


Anion Gap  Cancelled   10/09/20  06:08    


 


BUN  11 mg/dL (7-20)   10/09/20  06:08    


 


BUN  Cancelled   10/09/20  06:08    


 


Creatinine  0.64 mg/dL (0.52-1.25)   10/09/20  06:08    


 


Creatinine  Cancelled   10/09/20  06:08    


 


Est GFR ( Amer)  > 60  (>60)   10/09/20  06:08    


 


Est GFR ( Amer)  Cancelled   10/09/20  06:08    


 


Est GFR (Non-Af Amer)  Cancelled   10/09/20  06:08    


 


Est GFR (MDRD) Non-Af  > 60  (>60)   10/09/20  06:08    


 


Est GFR (MDRD) Non-Af  Cancelled   10/09/20  06:08    


 


Glucose  114 mg/dL ()  H  10/09/20  06:08    


 


Glucose  Cancelled   10/09/20  06:08    


 


Hemoglobin A1c %  5.4 % (4.7-6.0)   10/09/20  06:08    


 


Calcium  9.6 mg/dL (8.4-10.2)   10/09/20  06:08    


 


Calcium  Cancelled   10/09/20  06:08    


 


Magnesium  1.6 mg/dL (1.6-2.3)   10/08/20  05:38    


 


Total Bilirubin  0.5 mg/dL (0.2-1.3)   10/09/20  06:08    


 


Direct Bilirubin  0.2 mg/dL (0.0-0.4)   10/09/20  06:08    


 


Neonat Total Bilirubin  Not Reportable   10/09/20  06:08    


 


Neonat Direct Bilirubin  Not Reportable   10/09/20  06:08    


 


Neonat Indirect Bili  Not Reportable   10/09/20  06:08    


 


AST  24 U/L (14-36)   10/09/20  06:08    


 


ALT  13 U/L (<35)   10/09/20  06:08    


 


Alkaline Phosphatase  92 U/L ()   10/09/20  06:08    


 


Creatine Kinase  < 20 U/L ()  L  10/08/20  20:56    


 


CK-MB (CK-2)  0.37 ng/mL (<4.55)   10/08/20  20:56    


 


Troponin I  < 0.012 ng/mL  10/08/20  20:56    


 


NT-Pro-B Natriuret Pep  604 pg/mL (<125)  H  10/09/20  06:08    


 


Total Protein  6.7 g/dL (6.3-8.2)   10/09/20  06:08    


 


Albumin  3.3 g/dL (3.5-5.0)  L  10/09/20  06:08    


 


Triglycerides  173 mg/dL (<150)  H  10/09/20  06:08    


 


Cholesterol  139.85 mg/dL (0-200)   10/09/20  06:08    


 


LDL Cholesterol Direct  77 mg/dL (<100)   10/09/20  06:08    


 


VLDL Cholesterol  34.6 mg/dL (10-31)  H  10/09/20  06:08    


 


HDL Cholesterol  31 mg/dL (>40)  L  10/09/20  06:08    


 


EGFR   Cancelled   10/09/20  06:08    


 


TSH  1.30 uIU/mL (0.47-4.68)   10/08/20  05:38    


 


Free T4  1.47 ng/dL (0.78-2.19)   10/08/20  05:38    


 


Urine Color  YELLOW   10/07/20  22:44    


 


Urine Appearance  SLIGHTLY-CLOUDY   10/07/20  22:44    


 


Urine pH  5.0  (5.0-9.0)   10/07/20  22:44    


 


Ur Specific Gravity  1.016   10/07/20  22:44    


 


Urine Protein  NEGATIVE mg/dL (NEGATIVE)   10/07/20  22:44    


 


Urine Glucose (UA)  NEGATIVE mg/dL (NEGATIVE)   10/07/20  22:44    


 


Urine Ketones  NEGATIVE mg/dL (NEGATIVE)   10/07/20  22:44    


 


Urine Blood  NEGATIVE  (NEGATIVE)   10/07/20  22:44    


 


Urine Nitrite  NEGATIVE  (NEGATIVE)   10/07/20  22:44    


 


Urine Bilirubin  NEGATIVE  (NEGATIVE)   10/07/20  22:44    


 


Urine Urobilinogen  NEGATIVE mg/dL (<2.0)   10/07/20  22:44    


 


Ur Leukocyte Esterase  NEGATIVE  (NEGATIVE)   10/07/20  22:44    


 


Urine WBC (Auto)  2 /HPF  10/07/20  22:44    


 


Urine RBC (Auto)  1 /HPF  10/07/20  22:44    


 


U Hyaline Cast (Auto)  4 /LPF  10/07/20  22:44    


 


Urine Bacteria (Auto)  TRACE /HPF  10/07/20  22:44    


 


Squamous Epi Cells Auto  6 /HPF  10/07/20  22:44    


 


Urine Mucus (Auto)  RARE /LPF  10/07/20  22:44    


 


Urine Ascorbic Acid  NEGATIVE  (NEGATIVE)   10/07/20  22:44    








                                        











  10/07/20 10/07/20 10/08/20





  20:20 23:27 05:38


 


CK-MB (CK-2)  0.32   0.29


 


Troponin I  < 0.012  < 0.012  < 0.012


 


NT-Pro-B Natriuret Pep    635 H














  10/08/20 10/08/20 10/09/20





  14:47 20:56 06:08


 


CK-MB (CK-2)  0.34  0.37 


 


Troponin I  < 0.012  < 0.012 


 


NT-Pro-B Natriuret Pep    604 H











Impressions: 


                                        





Chest X-Ray  10/07/20 20:22


IMPRESSION:


 


No acute disease.


 


 


copyright 2011 [employer]- All Rights Reserved


 








KUB X-Ray  10/07/20 20:31


IMPRESSION: No acute intra-abdominal process is identified.


 














Stroke


Is this a Stroke Patient?: No





Acute Heart Failure


Is this a Heart Failure Patient?: No

## 2020-10-15 NOTE — EKG REPORT
SEVERITY:- ABNORMAL ECG -

ATRIAL FIBRILLATION, V-RATE 

BORDERLINE R WAVE PROGRESSION, ANTERIOR LEADS

BORDERLINE T ABNORMALITIES, ANTERIOR LEADS

:

Confirmed by: Jessica Lima 15-Oct-2020 21:50:01

## 2020-10-15 NOTE — RADIOLOGY REPORT (SQ)
EXAM DESCRIPTION:  CT HEAD WITHOUT



IMAGES COMPLETED DATE/TIME:  10/15/2020 3:56 pm



REASON FOR STUDY:  swelling



COMPARISON:  None.



TECHNIQUE:  Axial images acquired through the brain without intravenous contrast.  Images reviewed wi
th bone, brain and subdural windows.  Additional sagittal and coronal reconstructions were generated.
 Images stored on PACS.

All CT scanners at this facility use dose modulation, iterative reconstruction, and/or weight based d
osing when appropriate to reduce radiation dose to as low as reasonably achievable (ALARA).

[facility]: Dose Right  [facility]: CareDose    [facility]: Dose Right    [facility]: Teradose 4D    [facility]: Smart Technologies



RADIATION DOSE:  CT Rad equipment meets quality standard of care and radiation dose reduction techniq
ues were employed. CTDIvol: 53.2 - 55.2 mGy. DLP: 1634 mGy-cm. mGy.



LIMITATIONS:  Motion artifact.



FINDINGS:  VENTRICLES: Normal size and contour.

CEREBRUM: No masses.  No hemorrhage.  No midline shift. Large area of decreased density and encephalo
malacia in the right frontal, temporal, and parietal lobes

CEREBELLUM: No masses.  No hemorrhage.  No alteration of density.  No evidence for acute infarction.

EXTRAAXIAL SPACES: No fluid collections.  No masses.

ORBITS AND GLOBE: No intra- or extraconal masses.  Normal contour of globe without masses.

CALVARIUM: Large craniectomy defect on the right side.

PARANASAL SINUSES: No fluid or mucosal thickening.

SOFT TISSUES: No mass or hematoma.

OTHER: No other significant finding.



IMPRESSION:  SURGICAL CHANGES.  LARGE CRANIECTOMY DEFECT ON THE RIGHT SIDE WITH PRESUMED CHRONIC ORELLANA
GES IN THE RIGHT CEREBRAL HEMISPHERE.  NO DEFINITE ACUTE FINDINGS.  NO PRIOR STUDIES FOR COMPARISON.

EVIDENCE OF ACUTE STROKE: NO.



COMMENT:  Quality ID # [id number]: Final reports with documentation of one or more dose reduction techniques
 (e.g., Automated exposure control, adjustment of the mA and/or kV according to patient size, use of 
iterative reconstruction technique)



TECHNICAL DOCUMENTATION:  JOB ID:  [id number]

 2011 [employer]- All Rights Reserved



Reading location - IP/workstation name: [email or web address]

## 2020-10-15 NOTE — ER DOCUMENT REPORT
Entered by PRACHI CHENEY SCRIBE  10/15/20 1529 





Acting as scribe for:ZAIN HAHN MD





ED General





- General


Chief Complaint: Headache


Stated Complaint: HEAD SWELLING


Time Seen by Provider: 10/15/20 15:29


Mode of Arrival: Ambulatory


Information source: Patient


Notes: 





This 65 year old female patient s/p decompression craniotomy in early September 

after CVA presents to the emergency department today with complaints of a 

headache and right sided head swelling. Patient's sister at bedside reports that

she left the house earlier today and when she returned her brother told her that

the right side of the patient's head appeared swollen and she agreed with him so

she brought the patient in for evaluation. Patient mentions she has had a 

headache off and on for a few days. 





TRAVEL OUTSIDE OF THE [place] IN LAST 30 DAYS: No





- Related Data


Allergies/Adverse Reactions: 


                                        





baclofen Allergy (Verified 10/07/20 20:12)


   


morphine Allergy (Verified 10/07/20 20:12)


   











Past Medical History





- General


Information source: Patient





- Social History


Smoking Status: Former Smoker


Cigarette use (# per day): No


Frequency of alcohol use: None


Drug Abuse: None


Lives with: Family


Family History: Reviewed & Not Pertinent


Patient has homicidal ideation: No





- Past Medical History


Cardiac Medical History: Reports: Hx Atrial Fibrillation, Hx Hypertension


Pulmonary Medical History: Reports: Hx Asthma


Neurological Medical History: Reports: Hx Cerebrovascular Accident - s/p 

thrombectomy and decompression craniotomy


Endocrine Medical History: Reports: Hx Diabetes Mellitus Type 2


Psychiatric Medical History: Reports: Hx Depression


Past Surgical History: Reports: Hx Neurologic Surgery - Right-sided 

decompression craniotomy, Hx Tubal Ligation





Review of Systems





- Review of Systems


Constitutional: No symptoms reported


EENT: No symptoms reported


Cardiovascular: No symptoms reported


Respiratory: No symptoms reported


Gastrointestinal: No symptoms reported


Genitourinary: No symptoms reported


Female Genitourinary: No symptoms reported


Musculoskeletal: No symptoms reported


Skin: No symptoms reported


Hematologic/Lymphatic: No symptoms reported


Neurological/Psychological: See HPI, Headaches


-: Yes All other systems reviewed and negative





Physical Exam





- Vital signs


Vitals: 


                                        











Temp Pulse Resp BP Pulse Ox


 


 98.6 F   98   20   114/47 L  99 


 


 10/15/20 15:01  10/15/20 15:01  10/15/20 15:01  10/15/20 15:01  10/15/20 15:01














- Notes


Notes: 





Physical Exam:


 


General: Alert, oriented, appears chronically ill. 


 


HEENT: Atraumatic. PERRL. Extraocular movements intact. Oropharynx clear.  Large

defect of the right skull status post craniotomy, this area is soft, no obvious 

bulging.


 


Neck: Supple. Non-tender.


 


Respiratory: No respiratory distress. Clear and equal breath sounds bilaterally.


 


Cardiovascular: Irregularly irregular tachycardia without murmur heard.


 


Abdominal: Morbidly obese. Non-tender. No distension. Normal Bowel Sounds. 


 


Back: No gross abnormalities. 


 


Extremities: Left hemiaplasia





 


Neurological: Normal cognition. AAOx4.  Slightly dysarthric.


 


Psychological: Normal affect. Normal Mood. 


 


Skin: Warm. Dry. Normal color.





Course





- Re-evaluation


Re-evalutation: 





10/15/20 19:12


The patient's rate was fluctuating between the 130s to 150s, she got Cardizem 

bolus and drip and the rate is now 99.





- Vital Signs


Vital signs: 


                                        











Temp Pulse Resp BP Pulse Ox


 


 98.1 F   84   18   108/72   96 


 


 10/16/20 08:10  10/16/20 07:58  10/16/20 03:55  10/16/20 07:58  10/16/20 07:58














- Laboratory


Result Diagrams: 


                                 10/15/20 16:40





                                 10/15/20 16:40


Laboratory results interpreted by me: 


                                        











  10/15/20 10/15/20 10/15/20





  16:40 16:40 16:40


 


RDW  17.6 H  


 


Eos % (Auto)  8.1 H  


 


Glucose   128 H 


 


Creatine Kinase    < 20 L


 


Albumin   3.4 L 














- Diagnostic Test


Radiology reviewed: Image reviewed, Reports reviewed - Noncontrast CT scan of 

the head shows large craniotomy defect on the right side with presumed chronic 

changes in the right cerebral hemisphere.  No definite acute findings.  No prior

studies.





- EKG Interpretation by Me


EKG shows normal: Axis, Intervals.  abnormal: QRS Complexes - Borderline R wave 

progression anterior leads., ST-T Waves - Borderline T wave abnormalities in the

anterior leads


Rate: Tachycardia - 129


Rhythm: A.Fib


When compared to previous EKG there are: No significant change





- Consults


  ** Dr. Mendez


Time consulted: 19:10


Consulted provider: will see as inpatient





Critical Care Note





- Critical Care Note


Total time excluding time spent on procedures (mins): 35


Comments: 





At least 35 minutes spent evaluating patient, reviewing prior records.  Time 

spent determining there was not an acute neurological emergency, however a 

cardiac emergency situation was discovered necessitating IV Cardizem to slow her

A. fib with RVR.  Reviewing the patient's response to medication.  Time spent di

scussing the case with the patient and family, discussing the case with the 

patient's primary care provider and getting the admission process going.





Discharge





- Discharge


Clinical Impression: 


 Atrial fibrillation with RVR, S/P craniotomy





Headache


Qualifiers:


 Headache type: unspecified Headache chronicity pattern: unspecified pattern 

Intractability: not intractable Qualified Code(s): R51.9 - Headache, unspecified





Condition: Good


Disposition: ADMITTED AS INPATIENT


Admitting Provider: Andrea


Unit Admitted: [facility]





I personally performed the services described in the documentation, reviewed and

edited the documentation which was dictated to the scribe in my presence, and it

accurately records my words and actions.

## 2020-10-16 NOTE — PDOC PROGRESS REPORT
Subjective


Progress Note for:: 10/16/20


Subjective:: 





Patient was admitted yesterday for the management of atrial fibrillation with 

rapid ventricular response, she was seen today with the family in the room, I 

explained plan of care


Reason For Visit: 


A-FIB WITH RVR








Physical Exam


Vital Signs: 


                                        











Temp Pulse Resp BP Pulse Ox


 


 98.3 F   91   22 H  108/64   96 


 


 10/16/20 20:24  10/16/20 21:34  10/16/20 20:24  10/16/20 21:34  10/16/20 20:24








                                 Intake & Output











 10/15/20 10/16/20 10/17/20





 06:59 06:59 06:59


 


Intake Total  385 288


 


Output Total  0 


 


Balance  385 288


 


Weight  110.4 kg 











General appearance: PRESENT: no acute distress


Eye exam: PRESENT: PERRLA


Respiratory exam: PRESENT: clear to auscultation nando


Cardiovascular exam: PRESENT: +S1, +S2


GI/Abdominal exam: PRESENT: soft


Neurological exam: PRESENT: alert





Results


Laboratory Results: 


                                        





                                 10/15/20 16:40 





                                 10/15/20 16:40 





                                        











  10/15/20 10/16/20





  16:40 13:50


 


Magnesium  1.8 


 


Urine Color   YELLOW


 


Urine Appearance   SLIGHTLY-CLOUDY


 


Urine pH   6.0


 


Ur Specific Gravity   1.018


 


Urine Protein   NEGATIVE


 


Urine Glucose (UA)   NEGATIVE


 


Urine Ketones   NEGATIVE


 


Urine Blood   NEGATIVE


 


Urine Nitrite   NEGATIVE


 


Ur Leukocyte Esterase   NEGATIVE


 


Urine WBC (Auto)   2


 


Urine RBC (Auto)   13








                                        











  10/15/20 10/15/20 10/15/20





  16:40 20:53 20:53


 


Creatine Kinase  < 20 L  


 


CK-MB (CK-2)    0.29


 


Troponin I   < 0.012  Cancelled














  10/16/20 10/16/20





  04:32 10:39


 


Creatine Kinase  


 


CK-MB (CK-2)  0.52  0.46


 


Troponin I  < 0.012  < 0.012











Impressions: 


                                        





Head CT  10/15/20 00:00


IMPRESSION:  SURGICAL CHANGES.  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 

PRESUMED CHRONIC CHANGES IN THE RIGHT CEREBRAL HEMISPHERE.  NO DEFINITE ACUTE 

FINDINGS.  NO PRIOR STUDIES FOR COMPARISON.


EVIDENCE OF ACUTE STROKE: NO.


 














Assessment & Plan





- Diagnosis


(1) Atrial fibrillation with RVR


Is this a current diagnosis for this admission?: Yes   


Plan: 


She will continue IV Cardizem infusion for rate control








(2) Sick sinus syndrome


Is this a current diagnosis for this admission?: Yes   





- Time


Time Spent with patient: 25-34 minutes


Level of Care: IMCU


Medications reviewed and adjusted accordingly: Yes


Anticipated discharge: Home


Anticipated DC Timeframe: within 72 hours

## 2020-10-16 NOTE — PDOC H&P
History of Present Illness


Admission Date/PCP: 


  10/16/20 13:27





  GISELA DYKES MD





History of Present Illness: 


SHUN SUAREZ is a 65 year old female, She has a history of CVA status post 

decompression craniotomy in early September 2020, she came to emergency room for

evaluation of headache and right-sided head swelling, in the emergency room CAT 

scan of the head was done, demonstrated normal size ventricle no hemorrhage, no 

midline shift, large area of decreased density and encephalomalacia in the right

frontal, temporal and parietal lobes no masses no hemorrhage.  She was noticed 

in the emergency room to be in atrial fibrillation with rapid ventricular 

response, she was then started on intravenous Cardizem infusion.  She presented 

in the same manner the last time she was in the hospital, the last time she was 

admitted she had atrial fibrillation with rapid ventricular response associated 

with period of long pauses consistent with sick sinus syndrome.  The plan was 

for her to follow outpatient with EPS, electrophysiologist with the intent to 

have a pacemaker.Unfortunately she returned to the emergency room for evaluation

of headache and again she was found to be in atrial fibrillation with rapid 

ventricular response, inpatient care was recommended by the ED physician.








Past Medical History


Cardiac Medical History: Reports: Atrial Fibrillation, Hypertension


Pulmonary Medical History: Reports: Asthma


Neurological Medical History: Reports: Ischemic CVA


Endocrine Medical History: Reports: Diabetes Mellitus Type 2


Psychiatric Medical History: Reports: Depression





Past Surgical History


Past Surgical History: Reports: Tubal Ligation





Social History


Lives with: Family


Smoking Status: Former Smoker


Frequency of Alcohol Use: None


Hx Recreational Drug Use: No


Drugs: None


Hx Prescription Drug Abuse: No





Family History


Family History: Reviewed & Not Pertinent


Parental Family History Reviewed: Yes


Children Family History Reviewed: Yes


Sibling(s) Family History Reviewed.: Yes





Medication/Allergy


Home Medications: 








Albuterol Sulfate [Ventolin Hfa 8 gm Mdi] 2 puff IH TID 10/08/20 


Aspirin [Aspirin 325 mg Tablet] 325 mg PO DAILY 10/08/20 


Hydrochlorothiazide [Hydrodiuril 25 mg Tablet] 12.5 mg PO DAILY 10/08/20 


Metoprolol Tartrate [Lopressor 25 mg Tablet] 25 mg PO BID 10/08/20 


Montelukast Sodium [Singulair 10 mg Tablet] 10 mg PO QHS 10/08/20 


Pantoprazole Sodium [Protonix 40 mg Dr Tablet] 40 mg PO QAM 10/08/20 


Polyethylene Glycol 3350 [Miralax] 1 dose PO DAILY 10/08/20 


Pravastatin Sodium 40 mg PO QHS 10/08/20 


Sennosides/Docusate 8.6-50 mg [Senna Plus Tablet] 1 tab PO BID 10/08/20 


Sucralfate [Carafate 1 gm Tablet] 1 gm PO QID 10/08/20 


Venlafaxine HCl ER [Effexor Xr 75 mg Cap.sr] 75 mg PO DAILY 10/08/20 


Acetaminophen [Tylenol 325 mg Tablet] 650 mg PO Q4HP PRN  tablet 10/10/20 


Ergocalciferol (Vitamin D2) [Vitamin D2] 50,000 unit PO MIKE@1000 10/15/20 








Allergies/Adverse Reactions: 


                                        





baclofen Allergy (Verified 10/07/20 20:12)


   


morphine Allergy (Verified 10/07/20 20:12)


   











Review of Systems


Constitutional: PRESENT: headache(s)


Ears: ABSENT: hearing changes


Cardiovascular: ABSENT: chest pain, dyspnea on exertion, edema, orthropnea, 

palpitations


Respiratory: ABSENT: cough, hemoptysis


Gastrointestinal: ABSENT: abdominal pain, constipation, diarrhea, hematemesis, 

hematochezia, nausea, vomiting


Genitourinary: ABSENT: dysuria, hematuria


Musculoskeletal: ABSENT: joint swelling


Integumentary: ABSENT: rash, wounds


Neurological: PRESENT: abnormal gait, paresthesias


Psychiatric: PRESENT: anxiety


Endocrine: ABSENT: cold intolerance, heat intolerance, menstrual abnormalities, 

polydipsia, polyuria


Hematologic/Lymphatic: ABSENT: easy bleeding, easy bruising, lymphadenopathy





Physical Exam


Vital Signs: 


                                        











Temp Pulse Resp BP Pulse Ox


 


 98.3 F   91   22 H  108/64   96 


 


 10/16/20 20:24  10/16/20 21:34  10/16/20 20:24  10/16/20 21:34  10/16/20 20:24








                                 Intake & Output











 10/15/20 10/16/20 10/17/20





 06:59 06:59 06:59


 


Intake Total  385 288


 


Output Total  0 


 


Balance  385 288


 


Weight  110.4 kg 











Head exam: PRESENT: atraumatic, other - There is a large defect of the right 

skull status post craniotomy I do not appreciate any swelling


Eye exam: PRESENT: PERRLA


Ear exam: PRESENT: normal external ear exam


Mouth exam: PRESENT: moist, tongue midline


Neck exam: PRESENT: full ROM


Respiratory exam: PRESENT: clear to auscultation nando


Cardiovascular exam: PRESENT: RRR, +S1, +S2


Vascular exam: PRESENT: normal capillary refill


GI/Abdominal exam: PRESENT: normal bowel sounds, soft


Rectal exam: PRESENT: deferred


Neurological exam: PRESENT: alert, motor sensory deficit


Skin exam: ABSENT: cyanosis, rash





Results


Laboratory Results: 


                                        





                                 10/15/20 16:40 





                                 10/15/20 16:40 





                                        











  10/15/20 10/16/20





  16:40 13:50


 


Magnesium  1.8 


 


Urine Color   YELLOW


 


Urine Appearance   SLIGHTLY-CLOUDY


 


Urine pH   6.0


 


Ur Specific Gravity   1.018


 


Urine Protein   NEGATIVE


 


Urine Glucose (UA)   NEGATIVE


 


Urine Ketones   NEGATIVE


 


Urine Blood   NEGATIVE


 


Urine Nitrite   NEGATIVE


 


Ur Leukocyte Esterase   NEGATIVE


 


Urine WBC (Auto)   2


 


Urine RBC (Auto)   13








                                        











  10/15/20 10/15/20 10/15/20





  16:40 20:53 20:53


 


Creatine Kinase  < 20 L  


 


CK-MB (CK-2)    0.29


 


Troponin I   < 0.012  Cancelled














  10/16/20 10/16/20





  04:32 10:39


 


Creatine Kinase  


 


CK-MB (CK-2)  0.52  0.46


 


Troponin I  < 0.012  < 0.012











Impressions: 


                                        





Head CT  10/15/20 00:00


IMPRESSION:  SURGICAL CHANGES.  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 

PRESUMED CHRONIC CHANGES IN THE RIGHT CEREBRAL HEMISPHERE.  NO DEFINITE ACUTE 

FINDINGS.  NO PRIOR STUDIES FOR COMPARISON.


EVIDENCE OF ACUTE STROKE: NO.


 














Assessment & Plan





- Diagnosis


(1) Atrial fibrillation with RVR


Is this a current diagnosis for this admission?: Yes   


Plan: 


Patient on Cardizem infusion for rate control, patient is not presently a 

candidate for chronic anticoagulation because of recent craniotomy in the last 

month.  Because of atrial fibrillation she needs anticoagulation but because of 

recent craniotomy that is contraindicated because of increased risk of 

intracranial hemorrhage








(2) Sick sinus syndrome


Is this a current diagnosis for this admission?: Yes   


Plan: 


The last time she was admitted she had sick sinus syndrome, she was supposed to 

follow-up with electrophysiologist for evaluation of this condition and for p

ossible permanent pacemaker placement








- Time


Time Spent: Greater than 70 Minutes


Medications reviewed and adjusted accordingly: Yes


Anticipated Discharge Disposition: Home, Self Care


Anticipated Discharge Timeframe: within 72 hours

## 2020-10-17 NOTE — PDOC PROGRESS REPORT
Subjective


Progress Note for:: 10/17/20


Subjective:: 





Patient was admitted for the atrial fibrillation with rapid ventricular response

with a history of the sick sinus syndrome supposed to be have a pacemaker 

placement by Dr. Moore but patients never follow


Patient is currently on IV Cardizem drips


Patient unable to take anticoagulations due to recent craniotomy


Denied any chest pain no short of breath


Reason For Visit: 


A-FIB WITH RVR








Physical Exam


Vital Signs: 


                                        











Temp Pulse Resp BP Pulse Ox


 


 97.5 F   104 H  16   108/67   97 


 


 10/17/20 08:43  10/17/20 10:00  10/17/20 08:00  10/17/20 09:00  10/17/20 08:00








                                 Intake & Output











 10/16/20 10/17/20 10/18/20





 06:59 06:59 06:59


 


Intake Total 385 448 68


 


Output Total 0  


 


Balance 385 448 68


 


Weight 110.4 kg 112.3 kg 











General appearance: PRESENT: no acute distress


Head exam: PRESENT: atraumatic, normocephalic


Eye exam: PRESENT: conjunctiva pink, EOMI, PERRLA.  ABSENT: scleral icterus


Ear exam: PRESENT: normal external ear exam


Mouth exam: PRESENT: moist, tongue midline


Neck exam: PRESENT: full ROM.  ABSENT: carotid bruit, JVD, lymphadenopathy, 

thyromegaly


Respiratory exam: PRESENT: clear to auscultation nando


Cardiovascular exam: PRESENT: RRR.  ABSENT: diastolic murmur, rubs, systolic 

murmur


Pulses: PRESENT: normal dorsalis pedis pul, +2 pedal pulses bilateral


Vascular exam: PRESENT: normal capillary refill


GI/Abdominal exam: PRESENT: normal bowel sounds, soft.  ABSENT: distended, 

guarding, mass, organolmegaly, rebound, tenderness


Rectal exam: PRESENT: deferred


Neurological exam: PRESENT: alert, awake, oriented to person, oriented to place,

oriented to time, oriented to situation.  ABSENT: motor sensory deficit


Additional comments: 





Left-sided weakness due to the stroke


Psychiatric exam: PRESENT: appropriate affect, normal mood.  ABSENT: homicidal 

ideation, suicidal ideation


Skin exam: PRESENT: dry, intact, warm.  ABSENT: cyanosis, rash





Results


Laboratory Results: 


                                        





                                 10/15/20 16:40 





                                 10/15/20 16:40 





                                        











  10/16/20





  13:50


 


Urine Color  YELLOW


 


Urine Appearance  SLIGHTLY-CLOUDY


 


Urine pH  6.0


 


Ur Specific Gravity  1.018


 


Urine Protein  NEGATIVE


 


Urine Glucose (UA)  NEGATIVE


 


Urine Ketones  NEGATIVE


 


Urine Blood  NEGATIVE


 


Urine Nitrite  NEGATIVE


 


Ur Leukocyte Esterase  NEGATIVE


 


Urine WBC (Auto)  2


 


Urine RBC (Auto)  13








                                        











  10/15/20 10/15/20 10/15/20





  16:40 20:53 20:53


 


Creatine Kinase  < 20 L  


 


CK-MB (CK-2)    0.29


 


Troponin I   < 0.012  Cancelled














  10/16/20 10/16/20





  04:32 10:39


 


Creatine Kinase  


 


CK-MB (CK-2)  0.52  0.46


 


Troponin I  < 0.012  < 0.012











Impressions: 


                                        





Head CT  10/15/20 00:00


IMPRESSION:  SURGICAL CHANGES.  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 

PRESUMED CHRONIC CHANGES IN THE RIGHT CEREBRAL HEMISPHERE.  NO DEFINITE ACUTE 

FINDINGS.  NO PRIOR STUDIES FOR COMPARISON.


EVIDENCE OF ACUTE STROKE: NO.


 














Assessment & Plan





- Diagnosis


(1) Atrial fibrillation with RVR


Is this a current diagnosis for this admission?: Yes   





(2) S/P craniotomy


Is this a current diagnosis for this admission?: Yes   





(3) Sick sinus syndrome


Is this a current diagnosis for this admission?: Yes   





- Time


Time Spent with patient: 15-24 minutes


Level of Care: IMCU


Medications reviewed and adjusted accordingly: Yes


Anticipated discharge: Home


Anticipated DC Timeframe: Other





- Plan Summary


Plan Summary: 





We will consult the cardiology while patient is very noncompliance to follow as 

outpatient


Continues to Cardizem drips


Continues the aspirin

## 2020-10-17 NOTE — PDOC CONSULTATION
Consultation


Consult Date: 10/17/20


Attending physician:: SHIVANI ANDRADE


Provider Consulted: ABISAI STRICKLAND


Consult reason:: Atrial fibrillation





History of Present Illness


Admission Date/PCP: 


  10/16/20 13:27





  GISELA DYKES MD





Patient complains of: Palpitations


History of Present Illness: 


SHUN SUAREZ is a 65 year old female


Bowels


1.  CVA-9/21/2020 (right M1 occlusion)-status post TPA/mechanical thrombectomy


2.  Hemicraniectomy 9/22/2020


3.  Systemic hypertension


4.  Dyslipidemia


5.  Diabetes mellitus


6.  Asthma


7.  Nicotine dependence in remission


8.  Atrial fibrillation





Patient was admitted previously to the hospital in October of this year with 

atrial fibrillation with rapid ventricular response.  Ventricular rate was 

controlled with intravenous catheter blockers and with concomitant use of beta-

blockers there was mention of some bradycardia which appeared to have resolved. 

Patient is not on systemic anticoagulation at this moment due to complications 

from cerebral edema and her being status post craniectomy.





At the moment she does not smoke cigarettes


No female illnesses reported


Review of systems positive for palpitations negative for dyspnea or chest pain. 

Full 11 review of systems was asked.  Pertinent positives noted here and in the 

HPI all other systems are negative.





Past Medical History


Cardiac Medical History: Reports: Atrial Fibrillation, Hypertension


Pulmonary Medical History: Reports: Asthma


Neurological Medical History: Reports: Ischemic CVA


Endocrine Medical History: Reports: Diabetes Mellitus Type 2


Psychiatric Medical History: Reports: Depression





Past Surgical History


Past Surgical History: Reports: Tubal Ligation





Social History


Lives with: Family


Smoking Status: Former Smoker


Frequency of Alcohol Use: None


Hx Recreational Drug Use: No


Drugs: None


Hx Prescription Drug Abuse: No





Family History


Family History: Reviewed & Not Pertinent


Parental Family History Reviewed: Yes - No familial illnesses reported or 

documented


Children Family History Reviewed: NA


Sibling(s) Family History Reviewed.: NA





Medication/Allergy


Home Medications: 








Albuterol Sulfate [Ventolin Hfa 8 gm Mdi] 2 puff IH TID 10/08/20 


Aspirin [Aspirin 325 mg Tablet] 325 mg PO DAILY 10/08/20 


Hydrochlorothiazide [Hydrodiuril 25 mg Tablet] 12.5 mg PO DAILY 10/08/20 


Metoprolol Tartrate [Lopressor 25 mg Tablet] 25 mg PO BID 10/08/20 


Montelukast Sodium [Singulair 10 mg Tablet] 10 mg PO QHS 10/08/20 


Pantoprazole Sodium [Protonix 40 mg Dr Tablet] 40 mg PO QAM 10/08/20 


Polyethylene Glycol 3350 [Miralax] 1 dose PO DAILY 10/08/20 


Pravastatin Sodium 40 mg PO QHS 10/08/20 


Sennosides/Docusate 8.6-50 mg [Senna Plus Tablet] 1 tab PO BID 10/08/20 


Sucralfate [Carafate 1 gm Tablet] 1 gm PO QID 10/08/20 


Venlafaxine HCl ER [Effexor Xr 75 mg Cap.sr] 75 mg PO DAILY 10/08/20 


Acetaminophen [Tylenol 325 mg Tablet] 650 mg PO Q4HP PRN  tablet 10/10/20 


Ergocalciferol (Vitamin D2) [Vitamin D2] 50,000 unit PO MIKE@1000 10/15/20 








Allergies/Adverse Reactions: 


                                        





baclofen Allergy (Verified 10/07/20 20:12)


   


morphine Allergy (Verified 10/07/20 20:12)


   











Review of Systems


Constitutional: ABSENT: as per HPI, anorexia, chills, fatigue, fever(s), 

headache(s), night sweats, weakness, weight gain, weight loss, other


Cardiovascular: PRESENT: palpitations


Respiratory: ABSENT: as per HPI, cough, dyspnea, hemoptysis, sputum, other


Genitourinary: ABSENT: as per HPI, difficulty urinating, dysuria, hematuria, 

nocturia, other


Neurological: PRESENT: other


Psychiatric: ABSENT: as per HPI, anxiety, depression, hallucinations, homidical 

ideation, suicidal ideation, other





Physical Exam


Vital Signs: 


                                        











Temp Pulse Resp BP Pulse Ox


 


 97.5 F   86   16   120/83   97 


 


 10/17/20 08:43  10/17/20 14:00  10/17/20 08:00  10/17/20 13:00  10/17/20 08:00








                                 Intake & Output











 10/16/20 10/17/20 10/18/20





 06:59 06:59 06:59


 


Intake Total 385 448 193


 


Output Total 0  


 


Balance 385 448 193


 


Weight 110.4 kg 112.3 kg 











General appearance: PRESENT: cooperative, obese, well-developed, well-nourished


Head exam: PRESENT: other - Postsurgical changes from hemicraniectomy on the 

right side of the head the.


Eye exam: PRESENT: conjunctiva pink, EOMI


Respiratory exam: PRESENT: decreased breath sounds, prolonged expiratory phas, 

symmetrical, unlabored


Cardiovascular exam: PRESENT: irregular rhythm, +S1, +S2


Pulses: PRESENT: normal radial pulses


GI/Abdominal exam: PRESENT: soft


Rectal exam: PRESENT: deferred


Neurological exam: PRESENT: alert, awake, oriented to person, oriented to place


Psychiatric exam: PRESENT: flat affect


Skin exam: PRESENT: dry, intact





Results


Laboratory Results: 


                                        





                                 10/15/20 16:40 





                                 10/15/20 16:40 





                                        











  10/15/20 10/15/20 10/15/20





  16:40 20:53 20:53


 


Creatine Kinase  < 20 L  


 


CK-MB (CK-2)    0.29


 


Troponin I   < 0.012  Cancelled














  10/16/20 10/16/20





  04:32 10:39


 


Creatine Kinase  


 


CK-MB (CK-2)  0.52  0.46


 


Troponin I  < 0.012  < 0.012











EKG Comments: 





Transthoracic echocardiogram 10/8/2020


Left atrial ejection fraction 60 to 65%


Question of inlet VSD


Mild MR, mild AI, mild TR, mild PI


There is no pericardial effusion





Twelve-lead EKG 10/7/2020


Atrial fibrillation rapid ventricular response 152 bpm





Lead EKG 10/15/2020


Atrial fibrillation with rapid ventricular response 129 bpm





Head CT 10/15/2020


Surgical changes craniectomy defect on the right side chronic changes in the 

right cerebral hemisphere no acute findings troponin X 16 2020 -x2


Impressions: 


                                        





Head CT  10/15/20 00:00


IMPRESSION:  SURGICAL CHANGES.  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 

PRESUMED CHRONIC CHANGES IN THE RIGHT CEREBRAL HEMISPHERE.  NO DEFINITE ACUTE 

FINDINGS.  NO PRIOR STUDIES FOR COMPARISON.


EVIDENCE OF ACUTE STROKE: NO.


 














Assessment & Plan





- Diagnosis


(1) Atrial fibrillation


Qualifiers: 


   Atrial fibrillation type: unspecified   Qualified Code(s): I48.91 - 

Unspecified atrial fibrillation   


Is this a current diagnosis for this admission?: Yes   


Plan: 


Presently her atrial fibrillation is better rate controlled with a ventricular 

rate in the 106 bpm range this morning on telemetry at the time of my exam


Diltiazem infusion has been discontinued


I would recommend continuing low-dose beta-blocker for rate control with poss

ible addition of digoxin as necessary


On the previous visit there were some episodes mentioned of bradycardia.  Due to

this caution is necessary


Patient does not indicate any symptoms suggestive of chronotropic incompetence 

or sick sinus syndrome such as dizziness or syncope.


There is no urgent indication to pursue cardiac pacing.  At this moment we will 

continue to monitor on telemetry.





Given intracranial complication status post stroke and with surgery neurology 

input is required to pursue systemic anticoagulation.  At the moment we will 

hold off

## 2020-10-18 NOTE — PDOC PROGRESS REPORT
Subjective


Progress Note for:: 10/18/20


Subjective:: 





Patient is currently doing well


Denied any chest pain no short of breath


Patient heart rate is running 80-1 30 range


Discussed with the cardiology and suggest if needed at the digoxin


Continues on low pressures


No need for any immediate pacemaker placement


Reason For Visit: 


A-FIB WITH RVR








Physical Exam


Vital Signs: 


                                        











Temp Pulse Resp BP Pulse Ox


 


 97.4 F   120 H  28 H  135/63 H  97 


 


 10/18/20 10:00  10/18/20 07:00  10/18/20 03:24  10/18/20 03:24  10/18/20 03:24








                                 Intake & Output











 10/17/20 10/18/20 10/19/20





 06:59 06:59 06:59


 


Intake Total 448 583 


 


Balance 448 583 


 


Weight 112.3 kg 111.5 kg 











General appearance: PRESENT: no acute distress, well-developed, well-nourished


Head exam: PRESENT: atraumatic, normocephalic


Eye exam: PRESENT: conjunctiva pink, EOMI, PERRLA.  ABSENT: scleral icterus


Ear exam: PRESENT: normal external ear exam


Mouth exam: PRESENT: moist, tongue midline


Neck exam: PRESENT: full ROM.  ABSENT: carotid bruit, JVD, lymphadenopathy, 

thyromegaly


Cardiovascular exam: PRESENT: RRR.  ABSENT: diastolic murmur, rubs, systolic 

murmur


Vascular exam: PRESENT: normal capillary refill


GI/Abdominal exam: PRESENT: normal bowel sounds, soft.  ABSENT: distended, 

guarding, mass, organolmegaly, rebound, tenderness


Rectal exam: PRESENT: deferred


Neurological exam: PRESENT: alert, awake, oriented to person, oriented to place,

oriented to time, oriented to situation.  ABSENT: motor sensory deficit


Psychiatric exam: PRESENT: appropriate affect, normal mood.  ABSENT: homicidal 

ideation, suicidal ideation


Skin exam: PRESENT: dry, intact, warm.  ABSENT: cyanosis, rash





Results


Laboratory Results: 


                                        





                                 10/18/20 04:50 





                                 10/18/20 04:50 





                                        











  10/18/20 10/18/20





  04:50 04:50


 


WBC  7.2 


 


RBC  4.14 


 


Hgb  11.8 L 


 


Hct  34.4 L 


 


MCV  83 


 


MCH  28.5 


 


MCHC  34.3 


 


RDW  17.6 H 


 


Plt Count  214 


 


Seg Neutrophils %  54.2 


 


Sodium   140.8


 


Potassium   3.9


 


Chloride   104


 


Carbon Dioxide   28


 


Anion Gap   9


 


BUN   12


 


Creatinine   0.66


 


Est GFR (African Amer)   > 60


 


Glucose   107


 


Calcium   9.4








                                        











  10/15/20 10/15/20 10/15/20





  16:40 20:53 20:53


 


Creatine Kinase  < 20 L  


 


CK-MB (CK-2)    0.29


 


Troponin I   < 0.012  Cancelled














  10/16/20 10/16/20





  04:32 10:39


 


Creatine Kinase  


 


CK-MB (CK-2)  0.52  0.46


 


Troponin I  < 0.012  < 0.012











Impressions: 


                                        





Head CT  10/15/20 00:00


IMPRESSION:  SURGICAL CHANGES.  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 

PRESUMED CHRONIC CHANGES IN THE RIGHT CEREBRAL HEMISPHERE.  NO DEFINITE ACUTE 

FINDINGS.  NO PRIOR STUDIES FOR COMPARISON.


EVIDENCE OF ACUTE STROKE: NO.


 














Assessment & Plan





- Diagnosis


(1) Atrial fibrillation with RVR


Is this a current diagnosis for this admission?: Yes   





(2) S/P craniotomy


Is this a current diagnosis for this admission?: Yes   





(3) Sick sinus syndrome


Is this a current diagnosis for this admission?: Yes   





- Time


Time Spent with patient: 15-24 minutes


Level of Care: IMCU


Medications reviewed and adjusted accordingly: Yes


Anticipated discharge: Home with Homehealth


Anticipated DC Timeframe: within 48 hours





- Plan Summary


Plan Summary: 





Add the digoxin 0.125 daily


Continues to Lopressor


If is remained stable hopefully discharge tomorrow

## 2020-10-18 NOTE — EKG REPORT
SEVERITY:- BORDERLINE ECG -

WANDERING PACEMAKER, Consider AFIB

:

Confirmed by: Jessica Lima 18-Oct-2020 16:26:52

## 2020-10-18 NOTE — CDI QUERY
CDI Query


CDI Review: 





We are seeking further clarification of documentation to reflect the severity of

illness of your patient.





Documented in the H&P:





Atrial fibrillation with RVR


Patient on Cardizem infusion for rate control, patient is not presently a 

candidate for chronic anticoagulation because of recent craniotomy in the last 

month.  Because of atrial fibrillation she needs anticoagulation but because of 

recent craniotomy that is contraindicated because of increased risk of 

intracranial hemorrhage





Based on your medical judgement, can you further clarify in the Progress Notes 

if the Atrial Fibrillation can be further specified:





   Persistent Atrial fibrillation


   Chronic (Permanent) Atrial fibrillation


   Other


   Unable to determine





Thank you for your consideration.


BRENDA Zhu RN


Clinical 


Physician Advisor


[phone]


[email or web address]

## 2020-10-19 NOTE — PDOC PROGRESS REPORT
Subjective


Progress Note for:: 10/19/20


Subjective:: 


65-year-old female well-known to me from a prior admission who was readmitted on

10/16/2020 for rapid atrial fibrillation in the setting of a severe headache.  

The patient was previously admitted on 10/07/2020 for rapid A. fib.  Her heart 

rate was significantly improved on Lopressor 50 mg every 6 hours however she had

significant pauses as well as episodes of rapid ventricular response worrisome 

for sick sinus syndrome therefore we recommend that his Lopressor to be 

decreased to 25 mg p.o. every 6 hours.  Unfortunately the patient was discharged

on Lopressor 25 mg twice daily and developed rapid A. fib.  She was consulted to

my partner, Dr. Woo on 10/17/2020 who recommended the addition of digoxin.  

The patient feels well this morning and denies chest pain, palpitations, syncope

or presyncope however complains of mild shortness of breath.  His telemetry 

continues to show atrial fibrillation with RVR with heart rates up to 150 to 160

bpm.





Physical exam on 10/19/2020:


GENERAL: Oriented x3 with normal mood.  Not in acute distress.  Well groomed and

well developed.  The patient is noted to have her medications in her mouth but 

unable to swallow.


HEENT:  Normocephalic, atraumatic.  Pupils equal.   Sclerae anicteric.  

Oropharynx moist. 


NECK:  No JVD.  No carotid bruits. 


LUNGS:  Clear to auscultation bilaterally.  Normal respiratory effort without 

the use of accessory muscles or intercostal retractions.  


CARDIOVASCULAR: Irregularly irregular rate and rhythm without murmurs, rubs, or 

gallops.  PMI not displaced.


ABDOMEN: No masses or tenderness to palpation.  No bruit.  No splenomegaly or 

hepatomegaly. No abdominal aorta bruit noted.


EXTREMITIES: Trace pitting edema bilaterally, no cyanosis, no clubbing.  +2 

pulses femoral and pedal pulses bilaterally.  


SKIN: No lesions or rashes.


MUSCULOSKELETAL:  No chest tenderness to palpation.





Cardiac studies:


Echocardiogram on 10/08/2020:


-EF 60 to 65%.


-No wall motion abnormalities.


-Possible inlet VSD.


-Mild MR, mild AI, mild TR, mild PI.


Reason For Visit: 


A-FIB WITH RVR








Physical Exam


Vital Signs: 


                                        











Temp Pulse Resp BP Pulse Ox


 


 98.4 F   108 H  20   122/78   97 


 


 10/19/20 07:53  10/19/20 07:00  10/19/20 03:23  10/19/20 03:23  10/19/20 03:23








                                 Intake & Output











 10/18/20 10/19/20 10/20/20





 06:59 06:59 06:59


 


Intake Total 583 920 


 


Balance 583 920 


 


Weight 111.5 kg 111.5 kg 














Results


Laboratory Results: 


                                        





                                 10/18/20 04:50 





                                 10/19/20 05:09 





                                        











  10/19/20





  05:09


 


Sodium  143.1


 


Potassium  4.0


 


Chloride  106


 


Carbon Dioxide  27


 


Anion Gap  10


 


BUN  17


 


Creatinine  0.66


 


Est GFR (African Amer)  > 60


 


Glucose  109


 


Calcium  9.3








                                        











  10/15/20 10/15/20 10/15/20





  16:40 20:53 20:53


 


Creatine Kinase  < 20 L  


 


CK-MB (CK-2)    0.29


 


Troponin I   < 0.012  Cancelled














  10/16/20 10/16/20





  04:32 10:39


 


Creatine Kinase  


 


CK-MB (CK-2)  0.52  0.46


 


Troponin I  < 0.012  < 0.012











Impressions: 


                                        





Head CT  10/15/20 00:00


IMPRESSION:  SURGICAL CHANGES.  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 

PRESUMED CHRONIC CHANGES IN THE RIGHT CEREBRAL HEMISPHERE.  NO DEFINITE ACUTE FI

NDINGS.  NO PRIOR STUDIES FOR COMPARISON.


EVIDENCE OF ACUTE STROKE: NO.


 








                                        





                                 10/18/20 04:50 





                                 10/19/20 05:09 





                                        











MCV  83 fl (80-97)   10/18/20  04:50    


 


MCH  28.5 pg (27.0-33.4)   10/18/20  04:50    


 


MCHC  34.3 g/dL (32.0-36.0)   10/18/20  04:50    


 


RDW  17.6 % (11.5-14.0)  H  10/18/20  04:50    


 


Seg Neutrophils %  54.2 % (42-78)   10/18/20  04:50    


 


Chloride  106 mmol/L ()   10/19/20  05:09    


 


Carbon Dioxide  27 mmol/L (22-30)   10/19/20  05:09    


 


Anion Gap  10  (5-19)   10/19/20  05:09    


 


Est GFR ( Amer)  > 60  (>60)   10/19/20  05:09    


 


Glucose  109 mg/dL ()   10/19/20  05:09    


 


Calcium  9.3 mg/dL (8.4-10.2)   10/19/20  05:09    


 


Magnesium  1.8 mg/dL (1.6-2.3)   10/15/20  16:40    


 


Total Bilirubin  0.4 mg/dL (0.2-1.3)   10/15/20  16:40    


 


AST  20 U/L (14-36)   10/15/20  16:40    


 


Alkaline Phosphatase  81 U/L ()   10/15/20  16:40    


 


Total Protein  6.7 g/dL (6.3-8.2)   10/15/20  16:40    


 


Albumin  3.4 g/dL (3.5-5.0)  L  10/15/20  16:40    


 


Urine Color  YELLOW   10/16/20  13:50    


 


Urine Appearance  SLIGHTLY-CLOUDY   10/16/20  13:50    


 


Urine pH  6.0  (5.0-9.0)   10/16/20  13:50    


 


Ur Specific Gravity  1.018   10/16/20  13:50    


 


Urine Protein  NEGATIVE mg/dL (NEGATIVE)   10/16/20  13:50    


 


Urine Glucose (UA)  NEGATIVE mg/dL (NEGATIVE)   10/16/20  13:50    


 


Urine Ketones  NEGATIVE mg/dL (NEGATIVE)   10/16/20  13:50    


 


Urine Blood  NEGATIVE  (NEGATIVE)   10/16/20  13:50    


 


Urine Nitrite  NEGATIVE  (NEGATIVE)   10/16/20  13:50    


 


Ur Leukocyte Esterase  NEGATIVE  (NEGATIVE)   10/16/20  13:50    


 


Urine WBC (Auto)  2 /HPF  10/16/20  13:50    


 


Urine RBC (Auto)  13 /HPF  10/16/20  13:50    








                                        











  10/15/20 10/15/20 10/15/20





  16:40 20:53 20:53


 


Creatine Kinase  < 20 L  


 


CK-MB (CK-2)    0.29


 


Troponin I   < 0.012  Cancelled














  10/16/20 10/16/20





  04:32 10:39


 


Creatine Kinase  


 


CK-MB (CK-2)  0.52  0.46


 


Troponin I  < 0.012  < 0.012








                             Current Medication List











Generic Name Dose Route Start Last Admin





  Trade Name Freq  PRN Reason Stop Dose Admin


 


Acetaminophen  650 mg  10/15/20 22:17  10/16/20 20:34





  Tylenol 325 Mg Tablet  PO  11/14/20 22:16  650 mg





  Q4HP PRN   Administration





  FOR PAIN  


 


Aspirin  325 mg  10/16/20 10:00  10/18/20 10:35





  Aspirin 325 Mg Tablet  PO  11/15/20 09:59  325 mg





  DAILY CHAPITO   Administration


 


Atorvastatin Calcium  10 mg  10/16/20 22:00  10/18/20 21:35





  Lipitor 10 Mg Tablet  PO  11/15/20 21:59  10 mg





  QHS CHAPITO   Administration


 


Digoxin  0.125 mg  10/18/20 11:00  10/18/20 11:37





  Lanoxin 0.125 Mg Tablet  PO  11/17/20 10:59  0.125 mg





  DAILY CHAPITO   Administration


 


Enoxaparin Sodium  40 mg  10/16/20 10:00  10/18/20 10:53





  Lovenox Inj 40 Mg/0.4 Ml Disp.Syrin  SUBCUT  11/15/20 09:59  Not Given





  DAILY CHAPITO  


 


Ergocalciferol  50,000 unit  10/18/20 10:00  10/18/20 10:52





  Drisdol 50,000 Unit (1.25mg) Capsule  PO  11/17/20 09:59  50,000 unit





  MIKE@1000 CHAPITO   Administration


 


Metoprolol Tartrate  25 mg  10/16/20 10:00  10/18/20 17:24





  Lopressor 25 Mg Tablet  PO  11/15/20 09:59  25 mg





  BID CHAPITO   Administration


 


Montelukast Sodium  10 mg  10/16/20 22:00  10/18/20 21:35





  Singulair 10 Mg Tablet  PO  11/15/20 21:59  10 mg





  QHS CHAPITO   Administration


 


Pantoprazole Sodium  40 mg  10/16/20 08:00  10/18/20 07:42





  Protonix 40 Mg Dr Tablet  PO  11/15/20 07:59  40 mg





  QAM CHAPITO   Administration


 


Polyethylene Glycol  17 gm  10/16/20 10:00  10/18/20 10:36





  Miralax Powder 17 Gm/Packet  PO  11/15/20 09:59  17 gm





  DAILY CHAPITO   Administration


 


Senna/Docusate Sodium  1 each  10/16/20 10:00  10/18/20 17:24





  Senna Plus Tablet  PO  11/15/20 09:59  1 each





  BID CHAPITO   Administration


 


Sucralfate  1 gm  10/16/20 10:00  10/18/20 21:35





  Carafate 1 Gm Tablet  PO  11/15/20 09:59  1 gm





  QID CHAPITO   Administration


 


Venlafaxine HCl  75 mg  10/16/20 10:00  10/18/20 10:36





  Effexor Xr 75 Mg Cap.Sr  PO  11/15/20 09:59  75 mg





  DAILY CHAPITO   Administration














Discontinued Medications














Generic Name Dose Route Start Last Admin





  Trade Name Macarioq  PRN Reason Stop Dose Admin


 


Atorvastatin Calcium  10 mg  10/15/20 23:15  10/16/20 00:17





  Lipitor 10 Mg Tablet  PO  10/15/20 23:16  10 mg





  NOW ONE   Administration


 


Diltiazem HCl  10 mg  10/15/20 18:16  10/15/20 18:23





  Cardizem Inj 25 Mg/5 Ml Vial  IV  10/15/20 18:17  10 mg





  NOW ONE   Administration


 


Sodium Chloride  250 mls @ 0 mls/hr  10/15/20 18:15  10/15/20 19:41





  Nacl 0.9% 1000 Ml Iv Soln  IV  10/15/20 18:16  Infused





  BOLUS ONE   Infusion





  Wide Open  


 


Diltiazem HCl  125 mg in 125 mls @ 0 mls/hr  10/15/20 18:16  10/15/20 20:46





  Cardizem Rtu Inj 125 Mg-D5w 125 Ml Premix  IV  11/14/20 18:15  15 mls/hr





  CONTINUOUS PRN   15 mls/hr





  THIS MED IS NOT "PRN"   Titration





  Protocol  





  Titrate  


 


Diltiazem HCl  125 mg in 125 mls @ 0 mls/hr  10/16/20 01:07  10/17/20 12:10





  Cardizem Rtu Inj 125 Mg-D5w 125 Ml Premix  IV  11/15/20 01:06  0 mls/hr





  CONTINUOUS PRN   0 mls/hr





  THIS MED IS NOT "PRN"   Titration





  Protocol  





  Titrate  


 


Metoprolol Tartrate  5 mg  10/15/20 22:10  10/15/20 22:22





  Lopressor Inj/Pf 5 Mg/5 Ml Sdv  IV  10/15/20 22:11  5 mg





  NOW ONE   Administration


 


Metoprolol Tartrate  25 mg  10/15/20 23:00  10/16/20 00:17





  Lopressor 25 Mg Tablet  PO  10/15/20 23:01  25 mg





  NOW ONE   Administration


 


Montelukast Sodium  10 mg  10/15/20 23:00  10/16/20 00:17





  Singulair 10 Mg Tablet  PO  10/15/20 23:01  10 mg





  NOW ONE   Administration


 


Patient Own Medication  40 mg  10/15/20 22:30  10/16/20 03:21





  Pravastatin Sodium [Pravastatin Sodium]  PO  11/14/20 22:29  Not Given





  QHS CHAPITO  


 


Senna/Docusate Sodium  1 each  10/15/20 23:00  10/16/20 00:17





  Senna Plus Tablet  PO  10/15/20 23:01  1 each





  NOW ONE   Administration


 


Sucralfate  1 gm  10/15/20 23:00  10/16/20 00:18





  Carafate 1 Gm Tablet  PO  10/15/20 23:01  1 gm





  NOW ONE   Administration














Assessment & Plan





- Diagnosis


(1) Atrial fibrillation with RVR


Is this a current diagnosis for this admission?: Yes   


Plan: 


Atrial fibrillation continue to be suboptimally controlled with frequent 

episodes of rapid ventricular response.





Recommendations:


-Change Lopressor to 50 mg twice daily.


-Continue with digoxin for now.


-Check magnesium along with BMP and replace electrolytes as needed.


-Check digoxin level.


-Hold anticoagulation until cleared by neurology, primary hospitalist to contact

her neurologist to address this issue.


-We will continue to follow with you.

## 2020-10-19 NOTE — PDOC PROGRESS REPORT
Subjective


Progress Note for:: 10/19/20


Subjective:: 





Patient is seen by the bedside, she is still in A. fib with RVR, she was seen by

cardiology 


Reason For Visit: 


A-FIB WITH RVR








Physical Exam


Vital Signs: 


                                        











Temp Pulse Resp BP Pulse Ox


 


 98.4 F   105 H  18   148/93 H  98 


 


 10/19/20 20:28  10/19/20 20:28  10/19/20 20:28  10/19/20 20:28  10/19/20 20:28








                                 Intake & Output











 10/18/20 10/19/20 10/20/20





 06:59 06:59 06:59


 


Intake Total 583 920 874


 


Balance 583 920 874


 


Weight 111.5 kg 111.5 kg 











General appearance: PRESENT: no acute distress


Eye exam: PRESENT: PERRLA


Respiratory exam: PRESENT: clear to auscultation nando


Cardiovascular exam: PRESENT: +S1, +S2


GI/Abdominal exam: PRESENT: soft


Neurological exam: PRESENT: alert





Results


Laboratory Results: 


                                        





                                 10/18/20 04:50 





                                 10/19/20 05:09 





                                        











  10/19/20





  05:09


 


Sodium  143.1


 


Potassium  4.0


 


Chloride  106


 


Carbon Dioxide  27


 


Anion Gap  10


 


BUN  17


 


Creatinine  0.66


 


Est GFR (African Amer)  > 60


 


Glucose  109


 


Calcium  9.3








                                        











  10/15/20 10/15/20 10/15/20





  16:40 20:53 20:53


 


Creatine Kinase  < 20 L  


 


CK-MB (CK-2)    0.29


 


Troponin I   < 0.012  Cancelled














  10/16/20 10/16/20





  04:32 10:39


 


Creatine Kinase  


 


CK-MB (CK-2)  0.52  0.46


 


Troponin I  < 0.012  < 0.012











Impressions: 


                                        





Head CT  10/15/20 00:00


IMPRESSION:  SURGICAL CHANGES.  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 

PRESUMED CHRONIC CHANGES IN THE RIGHT CEREBRAL HEMISPHERE.  NO DEFINITE ACUTE 

FINDINGS.  NO PRIOR STUDIES FOR COMPARISON.


EVIDENCE OF ACUTE STROKE: NO.


 














Assessment & Plan





- Diagnosis


(1) Persistent atrial fibrillation with rapid ventricular response


Is this a current diagnosis for this admission?: Yes   


Plan: 


Adjust the dose of metoprolol to 50 mg p.o. twice daily, continue digoxin








(2) Sick sinus syndrome


Is this a current diagnosis for this admission?: Yes   





- Time


Time Spent with patient: 25-34 minutes


Level of Care: IMCU


Medications reviewed and adjusted accordingly: Yes


Anticipated discharge: Home

## 2020-10-20 NOTE — PDOC PROGRESS REPORT
Subjective


Progress Note for:: 10/20/20


Subjective:: 


65-year-old female well-known to me from a prior admission who was readmitted on

10/16/2020 for rapid atrial fibrillation in the setting of a severe headache.  

The patient was previously admitted on 10/07/2020 for rapid A. fib.  Her heart 

rate was significantly improved on Lopressor 50 mg every 6 hours however she had

significant pauses as well as episodes of rapid ventricular response worrisome 

for sick sinus syndrome therefore we recommend that his Lopressor to be 

decreased to 25 mg p.o. every 6 hours.  Unfortunately the patient was discharged

on Lopressor 25 mg twice daily and developed rapid A. fib.  She was consulted to

my partner, Dr. Woo on 10/17/2020 who recommended the addition of digoxin.  

The patient feels well this morning and denies chest pain, palpitations, syncope

or presyncope however complains of mild shortness of breath.  His telemetry 

continues to show atrial fibrillation with RVR with heart rates up to 150 to 160

bpm.





10/20/2020:


The patient had an uneventful night and her heart rate is responding nicely to 

increased doses of Lopressor.  Her telemetry shows rapid atrial fibrillation 

with improved heart rates and she has only received 2 doses of Lopressor.  There

has been now ventricular dysrhythmias and she denies cardiovascular complaints.





Physical exam on 10/20/2020:


GENERAL: Oriented x3 with normal mood.  Not in acute distress.  Well groomed and

well developed.  


HEENT:  Normocephalic, atraumatic.  Pupils equal.   Sclerae anicteric.  

Oropharynx moist. 


NECK:  No JVD.  No carotid bruits. 


LUNGS:  Clear to auscultation bilaterally.  Normal respiratory effort without 

the use of accessory muscles or intercostal retractions.  


CARDIOVASCULAR: Irregularly irregular rate and rhythm without murmurs, rubs, or 

gallops.  PMI not displaced.


EXTREMITIES: No pitting edema bilaterally, no cyanosis, no clubbing.  +2 pulses 

femoral and pedal pulses bilaterally.  


SKIN: No lesions or rashes.


MUSCULOSKELETAL:  No chest tenderness to palpation.





Cardiac studies:


Echocardiogram on 10/08/2020:


-EF 60 to 65%.


-No wall motion abnormalities.


-Possible inlet VSD.


-Mild MR, mild AI, mild TR, mild PI.


Reason For Visit: 


A-FIB WITH RVR








Physical Exam


Vital Signs: 


                                        











Temp Pulse Resp BP Pulse Ox


 


 98.6 F   103 H  18   133/86 H  93 


 


 10/20/20 07:46  10/20/20 07:46  10/20/20 07:46  10/20/20 07:46  10/20/20 07:46








                                 Intake & Output











 10/19/20 10/20/20 10/21/20





 06:59 06:59 06:59


 


Intake Total 920 874 


 


Balance 920 874 


 


Weight 111.5 kg 115.3 kg 














Results


Laboratory Results: 


                                        





                                 10/18/20 04:50 





                                 10/20/20 05:30 





                                        











  10/20/20





  05:30


 


Sodium  141.8


 


Potassium  4.2


 


Chloride  105


 


Carbon Dioxide  28


 


Anion Gap  9


 


BUN  14


 


Creatinine  0.55


 


Est GFR (African Amer)  > 60


 


Glucose  112 H


 


Calcium  9.4








                                        











  10/15/20 10/15/20 10/15/20





  16:40 20:53 20:53


 


Creatine Kinase  < 20 L  


 


CK-MB (CK-2)    0.29


 


Troponin I   < 0.012  Cancelled














  10/16/20 10/16/20





  04:32 10:39


 


Creatine Kinase  


 


CK-MB (CK-2)  0.52  0.46


 


Troponin I  < 0.012  < 0.012











Impressions: 


                                        





Head CT  10/15/20 00:00


IMPRESSION:  SURGICAL CHANGES.  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 

PRESUMED CHRONIC CHANGES IN THE RIGHT CEREBRAL HEMISPHERE.  NO DEFINITE ACUTE 

FINDINGS.  NO PRIOR STUDIES FOR COMPARISON.


EVIDENCE OF ACUTE STROKE: NO.


 








                                        





                                 10/18/20 04:50 





                                 10/20/20 05:30 





                                        











MCV  83 fl (80-97)   10/18/20  04:50    


 


MCH  28.5 pg (27.0-33.4)   10/18/20  04:50    


 


MCHC  34.3 g/dL (32.0-36.0)   10/18/20  04:50    


 


RDW  17.6 % (11.5-14.0)  H  10/18/20  04:50    


 


Seg Neutrophils %  54.2 % (42-78)   10/18/20  04:50    


 


Chloride  105 mmol/L ()   10/20/20  05:30    


 


Carbon Dioxide  28 mmol/L (22-30)   10/20/20  05:30    


 


Anion Gap  9  (5-19)   10/20/20  05:30    


 


Est GFR ( Amer)  > 60  (>60)   10/20/20  05:30    


 


Glucose  112 mg/dL ()  H  10/20/20  05:30    


 


Calcium  9.4 mg/dL (8.4-10.2)   10/20/20  05:30    


 


Magnesium  1.8 mg/dL (1.6-2.3)   10/15/20  16:40    


 


Total Bilirubin  0.4 mg/dL (0.2-1.3)   10/15/20  16:40    


 


AST  20 U/L (14-36)   10/15/20  16:40    


 


Alkaline Phosphatase  81 U/L ()   10/15/20  16:40    


 


Total Protein  6.7 g/dL (6.3-8.2)   10/15/20  16:40    


 


Albumin  3.4 g/dL (3.5-5.0)  L  10/15/20  16:40    


 


Urine Color  YELLOW   10/16/20  13:50    


 


Urine Appearance  SLIGHTLY-CLOUDY   10/16/20  13:50    


 


Urine pH  6.0  (5.0-9.0)   10/16/20  13:50    


 


Ur Specific Gravity  1.018   10/16/20  13:50    


 


Urine Protein  NEGATIVE mg/dL (NEGATIVE)   10/16/20  13:50    


 


Urine Glucose (UA)  NEGATIVE mg/dL (NEGATIVE)   10/16/20  13:50    


 


Urine Ketones  NEGATIVE mg/dL (NEGATIVE)   10/16/20  13:50    


 


Urine Blood  NEGATIVE  (NEGATIVE)   10/16/20  13:50    


 


Urine Nitrite  NEGATIVE  (NEGATIVE)   10/16/20  13:50    


 


Ur Leukocyte Esterase  NEGATIVE  (NEGATIVE)   10/16/20  13:50    


 


Urine WBC (Auto)  2 /HPF  10/16/20  13:50    


 


Urine RBC (Auto)  13 /HPF  10/16/20  13:50    








                                        











  10/15/20 10/15/20 10/15/20





  16:40 20:53 20:53


 


Creatine Kinase  < 20 L  


 


CK-MB (CK-2)    0.29


 


Troponin I   < 0.012  Cancelled














  10/16/20 10/16/20





  04:32 10:39


 


Creatine Kinase  


 


CK-MB (CK-2)  0.52  0.46


 


Troponin I  < 0.012  < 0.012








                             Current Medication List











Generic Name Dose Route Start Last Admin





  Trade Name Freq  PRN Reason Stop Dose Admin


 


Acetaminophen  650 mg  10/15/20 22:17  10/16/20 20:34





  Tylenol 325 Mg Tablet  PO  11/14/20 22:16  650 mg





  Q4HP PRN   Administration





  FOR PAIN  


 


Aspirin  325 mg  10/16/20 10:00  10/20/20 09:55





  Aspirin 325 Mg Tablet  PO  11/15/20 09:59  325 mg





  DAILY CHAPITO   Administration


 


Atorvastatin Calcium  10 mg  10/16/20 22:00  10/19/20 21:10





  Lipitor 10 Mg Tablet  PO  11/15/20 21:59  10 mg





  QHS CHAPITO   Administration


 


Digoxin  0.125 mg  10/18/20 11:00  10/20/20 09:56





  Lanoxin 0.125 Mg Tablet  PO  11/17/20 10:59  0.125 mg





  DAILY CHAPITO   Administration


 


Enoxaparin Sodium  40 mg  10/16/20 10:00  10/20/20 09:56





  Lovenox Inj 40 Mg/0.4 Ml Disp.Syrin  SUBCUT  11/15/20 09:59  40 mg





  DAILY CHAPITO   Administration


 


Ergocalciferol  50,000 unit  10/18/20 10:00  10/18/20 10:52





  Drisdol 50,000 Unit (1.25mg) Capsule  PO  11/17/20 09:59  50,000 unit





  MIKE@1000 CHAPITO   Administration


 


Metoprolol Tartrate  50 mg  10/19/20 22:15  10/20/20 09:55





  Lopressor 25 Mg Tablet  PO  11/18/20 22:14  50 mg





  BID CHAPITO   Administration


 


Montelukast Sodium  10 mg  10/16/20 22:00  10/19/20 21:10





  Singulair 10 Mg Tablet  PO  11/15/20 21:59  10 mg





  QHS CHAPITO   Administration


 


Pantoprazole Sodium  40 mg  10/16/20 08:00  10/20/20 09:55





  Protonix 40 Mg Dr Tablet  PO  11/15/20 07:59  40 mg





  QAM CHAPITO   Administration


 


Polyethylene Glycol  17 gm  10/16/20 10:00  10/20/20 09:56





  Miralax Powder 17 Gm/Packet  PO  11/15/20 09:59  17 gm





  DAILY CHAPITO   Administration


 


Senna/Docusate Sodium  1 each  10/16/20 10:00  10/20/20 09:55





  Senna Plus Tablet  PO  11/15/20 09:59  1 each





  BID CHAPITO   Administration


 


Sucralfate  1 gm  10/16/20 10:00  10/20/20 09:55





  Carafate 1 Gm Tablet  PO  11/15/20 09:59  1 gm





  QID CHAPITO   Administration


 


Venlafaxine HCl  75 mg  10/16/20 10:00  10/20/20 09:55





  Effexor Xr 75 Mg Cap.Sr  PO  11/15/20 09:59  75 mg





  DAILY CHAPITO   Administration














Discontinued Medications














Generic Name Dose Route Start Last Admin





  Trade Name Freq  PRN Reason Stop Dose Admin


 


Atorvastatin Calcium  10 mg  10/15/20 23:15  10/16/20 00:17





  Lipitor 10 Mg Tablet  PO  10/15/20 23:16  10 mg





  NOW ONE   Administration


 


Diltiazem HCl  10 mg  10/15/20 18:16  10/15/20 18:23





  Cardizem Inj 25 Mg/5 Ml Vial  IV  10/15/20 18:17  10 mg





  NOW ONE   Administration


 


Sodium Chloride  250 mls @ 0 mls/hr  10/15/20 18:15  10/15/20 19:41





  Nacl 0.9% 1000 Ml Iv Soln  IV  10/15/20 18:16  Infused





  BOLUS ONE   Infusion





  Wide Open  


 


Diltiazem HCl  125 mg in 125 mls @ 0 mls/hr  10/15/20 18:16  10/15/20 20:46





  Cardizem Rtu Inj 125 Mg-D5w 125 Ml Premix  IV  11/14/20 18:15  15 mls/hr





  CONTINUOUS PRN   15 mls/hr





  THIS MED IS NOT "PRN"   Titration





  Protocol  





  Titrate  


 


Diltiazem HCl  125 mg in 125 mls @ 0 mls/hr  10/16/20 01:07  10/17/20 12:10





  Cardizem Rtu Inj 125 Mg-D5w 125 Ml Premix  IV  11/15/20 01:06  0 mls/hr





  CONTINUOUS PRN   0 mls/hr





  THIS MED IS NOT "PRN"   Titration





  Protocol  





  Titrate  


 


Metoprolol Tartrate  5 mg  10/15/20 22:10  10/15/20 22:22





  Lopressor Inj/Pf 5 Mg/5 Ml Sdv  IV  10/15/20 22:11  5 mg





  NOW ONE   Administration


 


Metoprolol Tartrate  25 mg  10/16/20 10:00  10/19/20 17:07





  Lopressor 25 Mg Tablet  PO  11/15/20 09:59  25 mg





  BID CHAP[place]O   Administration


 


Metoprolol Tartrate  25 mg  10/15/20 23:00  10/16/20 00:17





  Lopressor 25 Mg Tablet  PO  10/15/20 23:01  25 mg





  NOW ONE   Administration


 


Montelukast Sodium  10 mg  10/15/20 23:00  10/16/20 00:17





  Singulair 10 Mg Tablet  PO  10/15/20 23:01  10 mg





  NOW ONE   Administration


 


Patient Own Medication  40 mg  10/15/20 22:30  10/16/20 03:21





  Pravastatin Sodium [Pravastatin Sodium]  PO  11/14/20 22:29  Not Given





  [facility]  


 


Senna/Docusate Sodium  1 each  10/15/20 23:00  10/16/20 00:17





  Senna Plus Tablet  PO  10/15/20 23:01  1 each





  NOW ONE   Administration


 


Sucralfate  1 gm  10/15/20 23:00  10/16/20 00:18





  Carafate 1 Gm Tablet  PO  10/15/20 23:01  1 gm





  NOW ONE   Administration














Assessment & Plan





- Diagnosis


(1) Atrial fibrillation with RVR


Is this a current diagnosis for this admission?: Yes   


Plan: 


Her heart rate is better controlled although she continues to experience 

episodes of very high heart rates although it is overall improved.  There is no 

evidence of ventricular dysrhythmias or significant bradycardia/AV block on tele

metry.


Recommendations:


-Continue with Lopressor to 50 mg twice daily for now.


-Continue with digoxin for now.


-Check magnesium along with BMP and replace electrolytes as needed.


-Check digoxin level.


-Hold anticoagulation until cleared by neurology, primary hospitalist to contact

her neurologist to address this issue.


-We will continue to follow with you.

## 2020-10-20 NOTE — PDOC PROGRESS REPORT
Subjective


Progress Note for:: 10/20/20


Subjective:: 





The ventricular rate is not well controlled, she was seen by the cardiologist 

Dr. Moore


Reason For Visit: 


A-FIB WITH RVR








Physical Exam


Vital Signs: 


                                        











Temp Pulse Resp BP Pulse Ox


 


 97.7 F   92   19   118/66   98 


 


 10/20/20 16:10  10/20/20 19:00  10/20/20 16:10  10/20/20 16:10  10/20/20 16:10








                                 Intake & Output











 10/19/20 10/20/20 10/21/20





 06:59 06:59 06:59


 


Intake Total 920 874 472


 


Balance 920 874 472


 


Weight 111.5 kg 115.3 kg 











General appearance: PRESENT: no acute distress


Eye exam: PRESENT: PERRLA


Respiratory exam: PRESENT: clear to auscultation nando


Cardiovascular exam: PRESENT: +S1, +S2


GI/Abdominal exam: PRESENT: soft


Neurological exam: PRESENT: alert





Results


Laboratory Results: 


                                        





                                 10/18/20 04:50 





                                 10/20/20 05:30 





                                        











  10/20/20





  05:30


 


Sodium  141.8


 


Potassium  4.2


 


Chloride  105


 


Carbon Dioxide  28


 


Anion Gap  9


 


BUN  14


 


Creatinine  0.55


 


Est GFR (African Amer)  > 60


 


Glucose  112 H


 


Calcium  9.4








                                        











  10/15/20 10/15/20 10/15/20





  16:40 20:53 20:53


 


Creatine Kinase  < 20 L  


 


CK-MB (CK-2)    0.29


 


Troponin I   < 0.012  Cancelled














  10/16/20 10/16/20





  04:32 10:39


 


Creatine Kinase  


 


CK-MB (CK-2)  0.52  0.46


 


Troponin I  < 0.012  < 0.012











Impressions: 


                                        





Head CT  10/15/20 00:00


IMPRESSION:  SURGICAL CHANGES.  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 

PRESUMED CHRONIC CHANGES IN THE RIGHT CEREBRAL HEMISPHERE.  NO DEFINITE ACUTE 

FINDINGS.  NO PRIOR STUDIES FOR COMPARISON.


EVIDENCE OF ACUTE STROKE: NO.


 














Assessment & Plan





- Diagnosis


(1) Persistent atrial fibrillation with rapid ventricular response


Is this a current diagnosis for this admission?: Yes   


Plan: 


Continue present regimen








(2) Sick sinus syndrome


Is this a current diagnosis for this admission?: Yes   





- Time


Time Spent with patient: 25-34 minutes


Level of Care: IMCU


Anticipated discharge: Home


Anticipated DC Timeframe: within 72 hours

## 2020-10-21 NOTE — PDOC PROGRESS REPORT
Subjective


Progress Note for:: 10/21/20


Subjective:: 


65-year-old female well-known to me from a prior admission who was readmitted on

10/16/2020 for rapid atrial fibrillation in the setting of a severe headache.  

The patient was previously admitted on 10/07/2020 for rapid A. fib.  Her heart 

rate was significantly improved on Lopressor 50 mg every 6 hours however she had

significant pauses as well as episodes of rapid ventricular response worrisome 

for sick sinus syndrome therefore we recommend that his Lopressor to be 

decreased to 25 mg p.o. every 6 hours.  Unfortunately the patient was discharged

on Lopressor 25 mg twice daily and developed rapid A. fib.  She was consulted to

my partner, Dr. Woo on 10/17/2020 who recommended the addition of digoxin.  

The patient feels well this morning and denies chest pain, palpitations, syncope

or presyncope however complains of mild shortness of breath.  His telemetry 

continues to show atrial fibrillation with RVR with heart rates up to 150 to 160

bpm.





10/21/2020:


The patient had an uneventful night however she continues to have rapid atrial 

fibrillation and occasional episodes of heart rate in the mid 40s but 

asymptomatic.  She gets particularly tachycardic with just moving in the bed.  

Her telemetry shows rapid atrial fibrillation with episodes of bradycardia in 

the mid 40s.  There has been no ventricular dysrhythmias and she denies 

cardiovascular complaints.





Physical exam on 10/21/2020:


GENERAL: Oriented x3 with normal mood.  Not in acute distress.  Well groomed and

well developed.  


HEENT:  Normocephalic, atraumatic.  Pupils equal.   Sclerae anicteric.  

Oropharynx moist. 


NECK:  No JVD.  No carotid bruits. 


LUNGS:  Clear to auscultation bilaterally.  Normal respiratory effort without 

the use of accessory muscles or intercostal retractions.  


CARDIOVASCULAR: Tachycardic.  Irregularly irregular rate and rhythm without 

murmurs, rubs, or gallops.  PMI not displaced.


EXTREMITIES: No pitting edema bilaterally, no cyanosis, no clubbing.  +2 pulses 

femoral and pedal pulses bilaterally.  


SKIN: No lesions or rashes.


MUSCULOSKELETAL:  No chest tenderness to palpation.





Cardiac studies:


Echocardiogram on 10/08/2020:


-EF 60 to 65%.


-No wall motion abnormalities.


-Possible inlet VSD.


-Mild MR, mild AI, mild TR, mild PI.


Reason For Visit: 


A-FIB WITH RVR








Physical Exam


Vital Signs: 


                                        











Temp Pulse Resp BP Pulse Ox


 


 98.4 F   89   16   132/81 H  99 


 


 10/21/20 03:47  10/21/20 03:47  10/21/20 03:47  10/21/20 03:47  10/21/20 03:47








                                 Intake & Output











 10/20/20 10/21/20 10/22/20





 06:59 06:59 06:59


 


Intake Total 874 472 


 


Balance 874 472 


 


Weight 115.3 kg 114.8 kg 














Results


Laboratory Results: 


                                        





                                 10/18/20 04:50 





                                 10/20/20 05:30 





                                        











  10/15/20 10/15/20 10/15/20





  16:40 20:53 20:53


 


Creatine Kinase  < 20 L  


 


CK-MB (CK-2)    0.29


 


Troponin I   < 0.012  Cancelled














  10/16/20 10/16/20





  04:32 10:39


 


Creatine Kinase  


 


CK-MB (CK-2)  0.52  0.46


 


Troponin I  < 0.012  < 0.012











Impressions: 


                                        





Head CT  10/15/20 00:00


IMPRESSION:  SURGICAL CHANGES.  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 

PRESUMED CHRONIC CHANGES IN THE RIGHT CEREBRAL HEMISPHERE.  NO DEFINITE ACUTE 

FINDINGS.  NO PRIOR STUDIES FOR COMPARISON.


EVIDENCE OF ACUTE STROKE: NO.


 














Assessment & Plan





- Diagnosis


(1) Atrial fibrillation with RVR


Is this a current diagnosis for this admission?: Yes   


Plan: 


Unfortunately she continues to have episodes of rapid atrial fibrillation with 

minimal activities such as eating breakfast in bed.





Recommendations:


-Increase Lopressor to 75 mg twice daily.


-Continue with digoxin for now.


-Check magnesium along with BMP and replace electrolytes as needed.


-Check digoxin level.


-Hold anticoagulation until cleared by neurology, primary hospitalist to contact

her neurologist to address this issue.


-We will continue to follow with you.

## 2020-10-21 NOTE — PDOC PROGRESS REPORT
Subjective


Progress Note for:: 10/21/20


Subjective:: 


Patient seen by the bedside, she was admitted for the management of atrial 

fibrillation with rapid ventricular response, the heart rate is not controlled 

yet on present regimen, she was seen earlier by the cardiologist, the metoprolol

dose increased was suggested  from the review of the cardiologist notes ,from 

50mg to 75 mg p.o. twice daily this was ordered in [facility] to have the dose 

adjusted to 75 twice daily in addition to digoxin





Reason For Visit: 


A-FIB WITH RVR








Physical Exam


Vital Signs: 


                                        











Temp Pulse Resp BP Pulse Ox


 


 98.0 F   96   16   103/78   96 


 


 10/21/20 12:09  10/21/20 14:00  10/21/20 12:09  10/21/20 12:09  10/21/20 12:09








                                 Intake & Output











 10/20/20 10/21/20 10/22/20





 06:59 06:59 06:59


 


Intake Total 874 472 574


 


Balance 874 472 574


 


Weight 115.3 kg 114.8 kg 











General appearance: PRESENT: no acute distress


Eye exam: PRESENT: PERRLA


Respiratory exam: PRESENT: clear to auscultation nando


Cardiovascular exam: PRESENT: +S1, +S2





Results


Laboratory Results: 


                                        





                                 10/18/20 04:50 





                                 10/21/20 10:56 





                                        











  10/21/20 10/21/20





  06:28 10:56


 


Sodium   142.3


 


Potassium   4.0


 


Chloride   103


 


Carbon Dioxide   31 H


 


Anion Gap   8


 


BUN   11


 


Creatinine   0.64


 


Est GFR (African Amer)   > 60


 


Glucose   113 H


 


Calcium   9.5


 


Magnesium  2.0  1.9








                                        











  10/15/20 10/15/20 10/15/20





  16:40 20:53 20:53


 


Creatine Kinase  < 20 L  


 


CK-MB (CK-2)    0.29


 


Troponin I   < 0.012  Cancelled














  10/16/20 10/16/20





  04:32 10:39


 


Creatine Kinase  


 


CK-MB (CK-2)  0.52  0.46


 


Troponin I  < 0.012  < 0.012











Impressions: 


                                        





Head CT  10/15/20 00:00


IMPRESSION:  SURGICAL CHANGES.  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 

PRESUMED CHRONIC CHANGES IN THE RIGHT CEREBRAL HEMISPHERE.  NO DEFINITE ACUTE 

FINDINGS.  NO PRIOR STUDIES FOR COMPARISON.


EVIDENCE OF ACUTE STROKE: NO.


 














Assessment & Plan





- Diagnosis


(1) Persistent atrial fibrillation with rapid ventricular response


Is this a current diagnosis for this admission?: Yes   


Plan: 


Adjust metoprolol to 75 mg p.o. twice daily








- Time


Time Spent with patient: 25-34 minutes


Level of Care: IMCU


Medications reviewed and adjusted accordingly: Yes


Anticipated discharge: Home


Anticipated DC Timeframe: within 72 hours

## 2020-10-22 NOTE — RADIOLOGY REPORT (SQ)
EXAM DESCRIPTION:  CHEST 2 VIEWS



IMAGES COMPLETED DATE/TIME:  10/22/2020 9:10 am



REASON FOR STUDY:  Rule out Heart Failure



COMPARISON:  10/7/2020.



EXAM PARAMETERS:  NUMBER OF VIEWS: two views

TECHNIQUE: Digital Frontal and Lateral radiographic views of the chest acquired.

RADIATION DOSE: NA

LIMITATIONS: none



FINDINGS:  LUNGS AND PLEURA: No opacities, masses or pneumothorax. No pleural effusion.

MEDIASTINUM AND HILAR STRUCTURES: No masses or contour abnormalities.

HEART AND VASCULAR STRUCTURES: Heart normal size.  No evidence for failure.

BONES: No acute findings.

HARDWARE: None in the chest.

OTHER: No other significant finding.



IMPRESSION:  NO ACUTE RADIOGRAPHIC FINDING IN THE CHEST.



TECHNICAL DOCUMENTATION:  JOB ID:  [id number]

 2011 [employer]- All Rights Reserved



Reading location - IP/workstation name: [email or web address]

## 2020-10-22 NOTE — PDOC PROGRESS REPORT
Subjective


Progress Note for:: 10/22/20


Subjective:: 


65-year-old female well-known to me from a prior admission who was readmitted on

10/16/2020 for rapid atrial fibrillation in the setting of a severe headache.  

The patient was previously admitted on 10/07/2020 for rapid A. fib.  Her heart 

rate was significantly improved on Lopressor 50 mg every 6 hours however she had

significant pauses as well as episodes of rapid ventricular response worrisome 

for sick sinus syndrome therefore we recommend that his Lopressor to be 

decreased to 25 mg p.o. every 6 hours.  Unfortunately the patient was discharged

on Lopressor 25 mg twice daily and developed rapid A. fib.  She was consulted to

my partner, Dr. Woo on 10/17/2020 who recommended the addition of digoxin.  

The patient feels well this morning and denies chest pain, palpitations, syncope

or presyncope however complains of mild shortness of breath.  His telemetry 

continues to show atrial fibrillation with RVR with heart rates up to 150 to 160

bpm.





10/22/2020:


The patient had an uneventful night with an improved heart rate and no 

bradycardia noted.  She has no symptoms and actually feels better.  However she 

continues to have rapid atrial fibrillation and occasional episodes of heart 

rate in the mid 40s but asymptomatic.  Her telemetry is free of ventricular 

dysrhythmias.  Her chest x-ray does not show evidence of heart failure.





Physical exam on 10/22/2020:


GENERAL: Oriented x3 with normal mood.  Not in acute distress.  Well groomed and

well developed.  


HEENT:  Normocephalic, atraumatic.  Pupils equal.   Sclerae anicteric.  

Oropharynx moist. 


NECK:  No JVD.  No carotid bruits. 


LUNGS:  Clear to auscultation bilaterally.  Normal respiratory effort without 

the use of accessory muscles or intercostal retractions.  


CARDIOVASCULAR: Irregularly irregular rate and rhythm without murmurs, rubs, or 

gallops.  PMI not displaced.


EXTREMITIES: No pitting edema bilaterally, no cyanosis, no clubbing.  +2 pulses 

femoral and pedal pulses bilaterally.  


SKIN: No lesions or rashes.


MUSCULOSKELETAL:  No chest tenderness to palpation.





Cardiac studies:


Echocardiogram on 10/08/2020:


-EF 60 to 65%.


-No wall motion abnormalities.


-Possible inlet VSD.


-Mild MR, mild AI, mild TR, mild PI.


Reason For Visit: 


A-FIB WITH RVR








Physical Exam


Vital Signs: 


                                        











Temp Pulse Resp BP Pulse Ox


 


 98.4 F   102 H  18   129/84 H  96 


 


 10/21/20 23:47  10/22/20 02:00  10/21/20 23:47  10/21/20 23:47  10/21/20 23:47








                                 Intake & Output











 10/20/20 10/21/20 10/22/20





 06:59 06:59 06:59


 


Intake Total 874 472 692


 


Balance 874 472 692


 


Weight 115.3 kg 114.8 kg 112.4 kg














Results


Laboratory Results: 


                                        





                                 10/18/20 04:50 





                                 10/21/20 10:56 





                                        











  10/21/20 10/21/20





  06:28 10:56


 


Sodium   142.3


 


Potassium   4.0


 


Chloride   103


 


Carbon Dioxide   31 H


 


Anion Gap   8


 


BUN   11


 


Creatinine   0.64


 


Est GFR (African Amer)   > 60


 


Glucose   113 H


 


Calcium   9.5


 


Magnesium  2.0  1.9








                                        











  10/15/20 10/15/20 10/15/20





  16:40 20:53 20:53


 


Creatine Kinase  < 20 L  


 


CK-MB (CK-2)    0.29


 


Troponin I   < 0.012  Cancelled














  10/16/20 10/16/20





  04:32 10:39


 


Creatine Kinase  


 


CK-MB (CK-2)  0.52  0.46


 


Troponin I  < 0.012  < 0.012











Impressions: 


                                        





Head CT  10/15/20 00:00


IMPRESSION:  SURGICAL CHANGES.  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 

PRESUMED CHRONIC CHANGES IN THE RIGHT CEREBRAL HEMISPHERE.  NO DEFINITE ACUTE 

FINDINGS.  NO PRIOR STUDIES FOR COMPARISON.


EVIDENCE OF ACUTE STROKE: NO.


 














Assessment & Plan





- Diagnosis


(1) Atrial fibrillation with RVR


Is this a current diagnosis for this admission?: Yes   


Plan: 


Her heart rate is improved however she has only received 1 dose of 75 mg of 

Lopressor last night, she has not received her morning dose yet.  Her digoxin 

level is not toxic.





Recommendations:


-Continue with current medical management for now.


-Anticipate discharge tomorrow.


-Hold anticoagulation until cleared by neurology, primary hospitalist to contact

her neurologist to address this issue.


-We will continue to follow with you.

## 2020-10-22 NOTE — PDOC PROGRESS REPORT
Subjective


Progress Note for:: 10/22/20


Subjective:: 





Patient is seen by the bedside, alert active better control, reviewed Dr. Moore note hopefully discharge home  tomorrow


Reason For Visit: 


A-FIB WITH RVR








Physical Exam


Vital Signs: 


                                        











Temp Pulse Resp BP Pulse Ox


 


 98.2 F   85   18   137/99 H  94 


 


 10/22/20 16:04  10/22/20 16:04  10/22/20 16:04  10/22/20 16:04  10/22/20 16:04








                                 Intake & Output











 10/21/20 10/22/20 10/23/20





 06:59 06:59 06:59


 


Intake Total 472 692 


 


Balance 472 692 


 


Weight 114.8 kg 112.4 kg 112.4 kg











General appearance: PRESENT: no acute distress


Eye exam: PRESENT: PERRLA


Respiratory exam: PRESENT: clear to auscultation nando


Cardiovascular exam: PRESENT: +S1, +S2


GI/Abdominal exam: PRESENT: soft


Neurological exam: PRESENT: alert, CN II-XII grossly intact





Results


Laboratory Results: 


                                        





                                 10/18/20 04:50 





                                 10/21/20 10:56 





                                        











  10/15/20 10/15/20 10/15/20





  16:40 20:53 20:53


 


Creatine Kinase  < 20 L  


 


CK-MB (CK-2)    0.29


 


Troponin I   < 0.012  Cancelled














  10/16/20 10/16/20





  04:32 10:39


 


Creatine Kinase  


 


CK-MB (CK-2)  0.52  0.46


 


Troponin I  < 0.012  < 0.012











Impressions: 


                                        





Head CT  10/15/20 00:00


IMPRESSION:  SURGICAL CHANGES.  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 

PRESUMED CHRONIC CHANGES IN THE RIGHT CEREBRAL HEMISPHERE.  NO DEFINITE ACUTE 

FINDINGS.  NO PRIOR STUDIES FOR COMPARISON.


EVIDENCE OF ACUTE STROKE: NO.


 








Chest X-Ray  10/22/20 00:00


IMPRESSION:  NO ACUTE RADIOGRAPHIC FINDING IN THE CHEST.


 














Assessment & Plan





- Diagnosis


(1) Persistent atrial fibrillation with rapid ventricular response


Is this a current diagnosis for this admission?: Yes   


Plan: 


Adjust metoprolol to 75 mg p.o. twice daily








- Time


Time Spent with patient: 15-24 minutes


Level of Care: IMCU


Medications reviewed and adjusted accordingly: Yes


Anticipated discharge: Home





- Inpatient Certification


Based on my medical assessment, after consideration of the patient's 

comorbidities, presenting symptoms, or acuity I expect that the services needed 

warrant INPATIENT care.: Yes


I certify that my determination is in accordance with my understanding of 

Medicare's requirements for reasonable and necessary INPATIENT services [42 CFR 

412.3e].: Yes

## 2020-10-23 NOTE — PDOC PROGRESS REPORT
Subjective


Progress Note for:: 10/23/20


Subjective:: 


65-year-old female well-known to me from a prior admission who was readmitted on

10/16/2020 for rapid atrial fibrillation in the setting of a severe headache.  

The patient was previously admitted on 10/07/2020 for rapid A. fib.  Her heart 

rate was significantly improved on Lopressor 50 mg every 6 hours however she had

significant pauses as well as episodes of rapid ventricular response worrisome 

for sick sinus syndrome therefore we recommend that his Lopressor to be 

decreased to 25 mg p.o. every 6 hours.  Unfortunately the patient was discharged

on Lopressor 25 mg twice daily and developed rapid A. fib.  She was consulted to

my partner, Dr. Woo on 10/17/2020 who recommended the addition of digoxin.  

The patient feels well this morning and denies chest pain, palpitations, syncope

or presyncope however complains of mild shortness of breath.  His telemetry 

continues to show atrial fibrillation with RVR with heart rates up to 150 to 160

bpm.





10/23/2020:


The patient had an uneventful night with a HR at goal now.  She has no symptoms 

and actually feels better.  Her telemetry is free of ventricular dysrhythmias as

well as significant bradycardic episodes.  Her chest x-ray does not show 

evidence of heart failure.





Physical exam on 10/23/2020:


GENERAL: Oriented x3 with normal mood.  Not in acute distress.  Well groomed and

well developed.  


HEENT:  Normocephalic, atraumatic.  Pupils equal.   Sclerae anicteric.  

Oropharynx moist. 


NECK:  No JVD.  No carotid bruits. 


LUNGS:  Clear to auscultation bilaterally.  Normal respiratory effort without 

the use of accessory muscles or intercostal retractions.  


CARDIOVASCULAR: Irregularly irregular rate and rhythm without murmurs, rubs, or 

gallops.  PMI not displaced.


EXTREMITIES: No pitting edema bilaterally, no cyanosis, no clubbing.  +2 pulses 

femoral and pedal pulses bilaterally.  


SKIN: No lesions or rashes.


MUSCULOSKELETAL:  No chest tenderness to palpation.





Cardiac studies:


Echocardiogram on 10/08/2020:


-EF 60 to 65%.


-No wall motion abnormalities.


-Possible inlet VSD.


-Mild MR, mild AI, mild TR, mild PI.


Reason For Visit: 


A-FIB WITH RVR








Physical Exam


Vital Signs: 


                                        











Temp Pulse Resp BP Pulse Ox


 


 97.8 F   97   18   117/83   93 


 


 10/23/20 04:30  10/23/20 04:30  10/23/20 04:30  10/23/20 04:30  10/23/20 04:30








                                 Intake & Output











 10/22/20 10/23/20 10/24/20





 06:59 06:59 06:59


 


Intake Total 692 240 


 


Balance 692 240 


 


Weight 112.4 kg 112.6 kg 














Results


Laboratory Results: 


                                        





                                 10/18/20 04:50 





                                 10/21/20 10:56 





                                        











  10/15/20 10/15/20 10/15/20





  16:40 20:53 20:53


 


Creatine Kinase  < 20 L  


 


CK-MB (CK-2)    0.29


 


Troponin I   < 0.012  Cancelled














  10/16/20 10/16/20





  04:32 10:39


 


Creatine Kinase  


 


CK-MB (CK-2)  0.52  0.46


 


Troponin I  < 0.012  < 0.012











Impressions: 


                                        





Head CT  10/15/20 00:00


IMPRESSION:  SURGICAL CHANGES.  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 

PRESUMED CHRONIC CHANGES IN THE RIGHT CEREBRAL HEMISPHERE.  NO DEFINITE ACUTE 

FINDINGS.  NO PRIOR STUDIES FOR COMPARISON.


EVIDENCE OF ACUTE STROKE: NO.


 








Chest X-Ray  10/22/20 00:00


IMPRESSION:  NO ACUTE RADIOGRAPHIC FINDING IN THE CHEST.


 








                                        





                                 10/23/20 08:20 





                                 10/23/20 08:20 





                                        











MCV  84 fl (80-97)   10/23/20  08:20    


 


MCH  28.1 pg (27.0-33.4)   10/23/20  08:20    


 


MCHC  33.7 g/dL (32.0-36.0)   10/23/20  08:20    


 


RDW  17.3 % (11.5-14.0)  H  10/23/20  08:20    


 


Seg Neutrophils %  48.6 % (42-78)   10/23/20  08:20    


 


Chloride  105 mmol/L ()   10/23/20  08:20    


 


Carbon Dioxide  27 mmol/L (22-30)   10/23/20  08:20    


 


Anion Gap  10  (5-19)   10/23/20  08:20    


 


Est GFR ( Amer)  > 60  (>60)   10/23/20  08:20    


 


Glucose  102 mg/dL ()   10/23/20  08:20    


 


Calcium  9.5 mg/dL (8.4-10.2)   10/23/20  08:20    


 


Magnesium  1.9 mg/dL (1.6-2.3)   10/21/20  10:56    


 


Total Bilirubin  0.4 mg/dL (0.2-1.3)   10/23/20  08:20    


 


AST  14 U/L (14-36)   10/23/20  08:20    


 


Alkaline Phosphatase  81 U/L ()   10/23/20  08:20    


 


Total Protein  6.6 g/dL (6.3-8.2)   10/23/20  08:20    


 


Albumin  3.5 g/dL (3.5-5.0)   10/23/20  08:20    


 


Urine Color  YELLOW   10/16/20  13:50    


 


Urine Appearance  SLIGHTLY-CLOUDY   10/16/20  13:50    


 


Urine pH  6.0  (5.0-9.0)   10/16/20  13:50    


 


Ur Specific Gravity  1.018   10/16/20  13:50    


 


Urine Protein  NEGATIVE mg/dL (NEGATIVE)   10/16/20  13:50    


 


Urine Glucose (UA)  NEGATIVE mg/dL (NEGATIVE)   10/16/20  13:50    


 


Urine Ketones  NEGATIVE mg/dL (NEGATIVE)   10/16/20  13:50    


 


Urine Blood  NEGATIVE  (NEGATIVE)   10/16/20  13:50    


 


Urine Nitrite  NEGATIVE  (NEGATIVE)   10/16/20  13:50    


 


Ur Leukocyte Esterase  NEGATIVE  (NEGATIVE)   10/16/20  13:50    


 


Urine WBC (Auto)  2 /HPF  10/16/20  13:50    


 


Urine RBC (Auto)  13 /HPF  10/16/20  13:50    








                                        











  10/15/20 10/15/20 10/15/20





  16:40 20:53 20:53


 


Creatine Kinase  < 20 L  


 


CK-MB (CK-2)    0.29


 


Troponin I   < 0.012  Cancelled














  10/16/20 10/16/20





  04:32 10:39


 


Creatine Kinase  


 


CK-MB (CK-2)  0.52  0.46


 


Troponin I  < 0.012  < 0.012














Assessment & Plan





- Diagnosis


(1) Atrial fibrillation with RVR


Is this a current diagnosis for this admission?: Yes   


Plan: 


Her heart rate is at goal.





Recommendations:


-Continue with current medical management.


-The patient will follow up with Dr. Woo withing one week of discharge. I 

already arranged for this.


-Hold anticoagulation until cleared by neurology.

## 2020-10-23 NOTE — PDOC DISCHARGE SUMMARY
Impression





- Admit/DC Date/PCP


Admission Date/Primary Care Provider: 


  10/16/20 13:27





  GISELA DYKES MD





Discharge Date: 10/23/20





- Discharge Diagnosis


(1) Persistent atrial fibrillation with rapid ventricular response


Is this a current diagnosis for this admission?: Yes   





(2) T2DM (type 2 diabetes mellitus)


Is this a current diagnosis for this admission?: Yes   





(3) S/P craniotomy


Is this a current diagnosis for this admission?: Yes   





- Additional Information


Referrals: 


GISELA DYKES MD [Primary Care Provider] - 10/26/20 2:30 pm


Prescriptions: 


Digoxin [Lanoxin 0.125 mg Tablet] 0.125 mg PO DAILY #90 tablet


Metoprolol Tartrate [Lopressor 50 mg Tablet] 75 mg PO Q12 #60 tablet


Home Medications: 








Albuterol Sulfate [Ventolin Hfa 8 gm Mdi] 2 puff IH TID 10/08/20 


Aspirin [Aspirin 325 mg Tablet] 325 mg PO DAILY 10/08/20 


Hydrochlorothiazide [Hydrodiuril 25 mg Tablet] 12.5 mg PO DAILY 10/08/20 


Montelukast Sodium [Singulair 10 mg Tablet] 10 mg PO QHS 10/08/20 


Pantoprazole Sodium [Protonix 40 mg Dr Tablet] 40 mg PO QAM 10/08/20 


Polyethylene Glycol 3350 [Miralax] 1 dose PO DAILY 10/08/20 


Pravastatin Sodium 40 mg PO QHS 10/08/20 


Sennosides/Docusate 8.6-50 mg [Senna Plus Tablet] 1 tab PO BID 10/08/20 


Sucralfate [Carafate 1 gm Tablet] 1 gm PO QID 10/08/20 


Venlafaxine HCl ER [Effexor Xr 75 mg Cap.sr] 75 mg PO DAILY 10/08/20 


Acetaminophen [Tylenol 325 mg Tablet] 650 mg PO Q4HP PRN  tablet 10/10/20 


Ergocalciferol (Vitamin D2) [Vitamin D2] 50,000 unit PO MIKE@1000 10/15/20 


Digoxin [Lanoxin 0.125 mg Tablet] 0.125 mg PO DAILY #90 tablet 10/23/20 


Metoprolol Tartrate [Lopressor 50 mg Tablet] 75 mg PO Q12 #60 tablet 10/23/20 











History of Present Illiness


History of Present Illness: 


SHUN SUAREZ is a 65 year old female, She has a history of CVA status post 

decompression craniotomy in early September 2020, she came to emergency room for

evaluation of headache and right-sided head swelling, in the emergency room CAT 

scan of the head was done, demonstrated normal size ventricle no hemorrhage, no 

midline shift, large area of decreased density and encephalomalacia in the right

frontal, temporal and parietal lobes no masses no hemorrhage.  She was noticed 

in the emergency room to be in atrial fibrillation with rapid ventricular 

response, she was then started on intravenous Cardizem infusion.  She presented 

in the same manner the last time she was in the hospital, the last time she was 

admitted she had atrial fibrillation with rapid ventricular response associated 

with period of long pauses consistent with sick sinus syndrome.  The plan was 

for her to follow outpatient with EPS, electrophysiologist with the intent to 

have a pacemaker.Unfortunately she returned to the emergency room for evaluation

of headache and again she was found to be in atrial fibrillation with rapid 

ventricular response, inpatient care was recommended by the ED physician.








Hospital Course


Hospital Course: 


Patient was admitted for the management of persistent atrial fibrillation with 

rapid regular response she was treated initially with intravenous Cardizem 

infusion to achieve rate control, she was seen in consultation by cardiology Dr. Moore, digoxin was added to achieve rate control of ventricle, the 

metoprolol dose was increased to 75 mg p.o. twice daily, rate control was 

achieved in the last 24 hours, patient is  been discharged  today.She is not a 

candidate for anticoagulation because of recent craniotomy, she is supposed to 

return to [place] for the completion of the craniotomy that was done when she 

sustained a stroke with cerebral edema





Physical Exam


Vital Signs: 


                                        











Temp Pulse Resp BP Pulse Ox


 


 98.1 F   84   18   126/78 H  92 


 


 10/23/20 11:50  10/23/20 11:50  10/23/20 11:50  10/23/20 11:50  10/23/20 11:50








                                 Intake & Output











 10/22/20 10/23/20 10/24/20





 06:59 06:59 06:59


 


Intake Total 692 240 118


 


Balance 692 240 118


 


Weight 112.4 kg 112.6 kg 











General appearance: PRESENT: no acute distress


Eye exam: PRESENT: PERRLA


Respiratory exam: PRESENT: clear to auscultation nando


Cardiovascular exam: PRESENT: +S1, +S2


GI/Abdominal exam: PRESENT: soft


Neurological exam: PRESENT: alert, CN II-XII grossly intact





Results


Laboratory Results: 


                                        











WBC  7.1 10^3/uL (4.0-10.5)   10/23/20  08:20    


 


RBC  4.46 10^6/uL (3.72-5.28)   10/23/20  08:20    


 


Hgb  12.6 g/dL (12.0-15.5)   10/23/20  08:20    


 


Hct  37.3 % (36.0-47.0)   10/23/20  08:20    


 


MCV  84 fl (80-97)   10/23/20  08:20    


 


MCH  28.1 pg (27.0-33.4)   10/23/20  08:20    


 


MCHC  33.7 g/dL (32.0-36.0)   10/23/20  08:20    


 


RDW  17.3 % (11.5-14.0)  H  10/23/20  08:20    


 


Plt Count  222 10^3/uL (150-450)   10/23/20  08:20    


 


Lymph % (Auto)  36.9 % (13-45)   10/23/20  08:20    


 


Mono % (Auto)  7.2 % (3-13)   10/23/20  08:20    


 


Eos % (Auto)  6.6 % (0-6)  H  10/23/20  08:20    


 


Baso % (Auto)  0.7 % (0-2)   10/23/20  08:20    


 


Absolute Neuts (auto)  3.4 10^3/uL (1.7-8.2)   10/23/20  08:20    


 


Absolute Lymphs (auto)  2.6 10^3/uL (0.5-4.7)   10/23/20  08:20    


 


Absolute Monos (auto)  0.5 10^3/uL (0.1-1.4)   10/23/20  08:20    


 


Absolute Eos (auto)  0.5 10^3/uL (0.0-0.6)   10/23/20  08:20    


 


Absolute Basos (auto)  0.0 10^3/uL (0.0-0.2)   10/23/20  08:20    


 


Seg Neutrophils %  48.6 % (42-78)   10/23/20  08:20    


 


PT  13.5 SEC (11.4-15.4)   10/15/20  16:40    


 


INR  1.01   10/15/20  16:40    


 


Sodium  142.1 mmol/L (137-145)   10/23/20  08:20    


 


Potassium  4.2 mmol/L (3.6-5.0)   10/23/20  08:20    


 


Chloride  105 mmol/L ()   10/23/20  08:20    


 


Carbon Dioxide  27 mmol/L (22-30)   10/23/20  08:20    


 


Anion Gap  10  (5-19)   10/23/20  08:20    


 


BUN  10 mg/dL (7-20)   10/23/20  08:20    


 


Creatinine  0.64 mg/dL (0.52-1.25)   10/23/20  08:20    


 


Est GFR ( Amer)  > 60  (>60)   10/23/20  08:20    


 


Est GFR (MDRD) Non-Af  > 60  (>60)   10/23/20  08:20    


 


Glucose  102 mg/dL ()   10/23/20  08:20    


 


POC Glucose  104 mg/dL ()   10/23/20  11:49    


 


Calcium  9.5 mg/dL (8.4-10.2)   10/23/20  08:20    


 


Magnesium  1.9 mg/dL (1.6-2.3)   10/21/20  10:56    


 


Total Bilirubin  0.4 mg/dL (0.2-1.3)   10/23/20  08:20    


 


Direct Bilirubin  0.3 mg/dL (0.0-0.4)   10/23/20  08:20    


 


Neonat Total Bilirubin  Not Reportable   10/23/20  08:20    


 


Neonat Direct Bilirubin  Not Reportable   10/23/20  08:20    


 


Neonat Indirect Bili  Not Reportable   10/23/20  08:20    


 


AST  14 U/L (14-36)   10/23/20  08:20    


 


ALT  7 U/L (<35)   10/23/20  08:20    


 


Alkaline Phosphatase  81 U/L ()   10/23/20  08:20    


 


Creatine Kinase  < 20 U/L ()  L  10/15/20  16:40    


 


CK-MB (CK-2)  0.46 ng/mL (<4.55)   10/16/20  10:39    


 


Troponin I  < 0.012 ng/mL  10/16/20  10:39    


 


Total Protein  6.6 g/dL (6.3-8.2)   10/23/20  08:20    


 


Albumin  3.5 g/dL (3.5-5.0)   10/23/20  08:20    


 


Urine Color  YELLOW   10/16/20  13:50    


 


Urine Appearance  SLIGHTLY-CLOUDY   10/16/20  13:50    


 


Urine pH  6.0  (5.0-9.0)   10/16/20  13:50    


 


Ur Specific Gravity  1.018   10/16/20  13:50    


 


Urine Protein  NEGATIVE mg/dL (NEGATIVE)   10/16/20  13:50    


 


Urine Glucose (UA)  NEGATIVE mg/dL (NEGATIVE)   10/16/20  13:50    


 


Urine Ketones  NEGATIVE mg/dL (NEGATIVE)   10/16/20  13:50    


 


Urine Blood  NEGATIVE  (NEGATIVE)   10/16/20  13:50    


 


Urine Nitrite  NEGATIVE  (NEGATIVE)   10/16/20  13:50    


 


Urine Bilirubin  NEGATIVE  (NEGATIVE)   10/16/20  13:50    


 


Urine Urobilinogen  NEGATIVE mg/dL (<2.0)   10/16/20  13:50    


 


Ur Leukocyte Esterase  NEGATIVE  (NEGATIVE)   10/16/20  13:50    


 


Urine WBC (Auto)  2 /HPF  10/16/20  13:50    


 


Urine RBC (Auto)  13 /HPF  10/16/20  13:50    


 


Urine Bacteria (Auto)  TRACE /HPF  10/16/20  13:50    


 


Squamous Epi Cells Auto  11 /HPF  10/16/20  13:50    


 


Urine Mucus (Auto)  RARE /LPF  10/16/20  13:50    


 


Urine Ascorbic Acid  NEGATIVE  (NEGATIVE)   10/16/20  13:50    


 


Digoxin  0.60 ng/mL (0.8-2.0)  L  10/21/20  10:56    








                                        











  10/15/20 10/15/20 10/16/20





  20:53 20:53 04:32


 


CK-MB (CK-2)   0.29  0.52


 


Troponin I  < 0.012  Cancelled  < 0.012














  10/16/20





  10:39


 


CK-MB (CK-2)  0.46


 


Troponin I  < 0.012











Impressions: 


                                        





Head CT  10/15/20 00:00


IMPRESSION:  SURGICAL CHANGES.  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 

PRESUMED CHRONIC CHANGES IN THE RIGHT CEREBRAL HEMISPHERE.  NO DEFINITE ACUTE 

FINDINGS.  NO PRIOR STUDIES FOR COMPARISON.


EVIDENCE OF ACUTE STROKE: NO.


 








Chest X-Ray  10/22/20 00:00


IMPRESSION:  NO ACUTE RADIOGRAPHIC FINDING IN THE CHEST.


 














Stroke


Is this a Stroke Patient?: No





Acute Heart Failure


Is this a Heart Failure Patient?: No

## 2020-11-23 ENCOUNTER — HOSPITAL ENCOUNTER (INPATIENT)
Dept: HOSPITAL 62 - ER | Age: 65
LOS: 9 days | Discharge: HOME | DRG: 175 | End: 2020-12-02
Attending: INTERNAL MEDICINE | Admitting: INTERNAL MEDICINE
Payer: MEDICAID

## 2020-11-23 DIAGNOSIS — Z20.828: ICD-10-CM

## 2020-11-23 DIAGNOSIS — I69.398: ICD-10-CM

## 2020-11-23 DIAGNOSIS — Z88.8: ICD-10-CM

## 2020-11-23 DIAGNOSIS — Z79.82: ICD-10-CM

## 2020-11-23 DIAGNOSIS — Z88.6: ICD-10-CM

## 2020-11-23 DIAGNOSIS — B95.8: ICD-10-CM

## 2020-11-23 DIAGNOSIS — I69.354: ICD-10-CM

## 2020-11-23 DIAGNOSIS — I26.09: Primary | ICD-10-CM

## 2020-11-23 DIAGNOSIS — G93.89: ICD-10-CM

## 2020-11-23 DIAGNOSIS — Z79.899: ICD-10-CM

## 2020-11-23 DIAGNOSIS — I10: ICD-10-CM

## 2020-11-23 DIAGNOSIS — E11.42: ICD-10-CM

## 2020-11-23 DIAGNOSIS — F32.9: ICD-10-CM

## 2020-11-23 DIAGNOSIS — I48.0: ICD-10-CM

## 2020-11-23 LAB
ADD MANUAL DIFF: NO
ADD MANUAL DIFF: NO
ALBUMIN SERPL-MCNC: 3.6 G/DL (ref 3.5–5)
ALP SERPL-CCNC: 80 U/L (ref 38–126)
AMYLASE SERPL-CCNC: 41 U/L (ref 30–110)
ANION GAP SERPL CALC-SCNC: 11 MMOL/L (ref 5–19)
APTT BLD: 147.7 SEC (ref 23.5–35.8)
APTT BLD: 37.7 SEC (ref 23.5–35.8)
AST SERPL-CCNC: 22 U/L (ref 14–36)
BASOPHILS # BLD AUTO: 0 10^3/UL (ref 0–0.2)
BASOPHILS # BLD AUTO: 0.1 10^3/UL (ref 0–0.2)
BASOPHILS NFR BLD AUTO: 0.3 % (ref 0–2)
BASOPHILS NFR BLD AUTO: 0.5 % (ref 0–2)
BILIRUB DIRECT SERPL-MCNC: 0.2 MG/DL (ref 0–0.4)
BILIRUB SERPL-MCNC: 0.6 MG/DL (ref 0.2–1.3)
BUN SERPL-MCNC: 10 MG/DL (ref 7–20)
CALCIUM: 9.4 MG/DL (ref 8.4–10.2)
CHLORIDE SERPL-SCNC: 104 MMOL/L (ref 98–107)
CK MB SERPL-MCNC: < 0.22 NG/ML (ref ?–4.55)
CK MB SERPL-MCNC: < 0.22 NG/ML (ref ?–4.55)
CK SERPL-CCNC: 46 U/L (ref 30–135)
CO2 SERPL-SCNC: 25 MMOL/L (ref 22–30)
EOSINOPHIL # BLD AUTO: 0.1 10^3/UL (ref 0–0.6)
EOSINOPHIL # BLD AUTO: 0.2 10^3/UL (ref 0–0.6)
EOSINOPHIL NFR BLD AUTO: 1 % (ref 0–6)
EOSINOPHIL NFR BLD AUTO: 1.6 % (ref 0–6)
ERYTHROCYTE [DISTWIDTH] IN BLOOD BY AUTOMATED COUNT: 17.1 % (ref 11.5–14)
ERYTHROCYTE [DISTWIDTH] IN BLOOD BY AUTOMATED COUNT: 17.1 % (ref 11.5–14)
GLUCOSE SERPL-MCNC: 138 MG/DL (ref 75–110)
HCT VFR BLD CALC: 35.4 % (ref 36–47)
HCT VFR BLD CALC: 38.2 % (ref 36–47)
HGB BLD-MCNC: 11.8 G/DL (ref 12–15.5)
HGB BLD-MCNC: 12.7 G/DL (ref 12–15.5)
INR PPP: 1.27
INR PPP: 1.37
LYMPHOCYTES # BLD AUTO: 2.3 10^3/UL (ref 0.5–4.7)
LYMPHOCYTES # BLD AUTO: 2.6 10^3/UL (ref 0.5–4.7)
LYMPHOCYTES NFR BLD AUTO: 18.1 % (ref 13–45)
LYMPHOCYTES NFR BLD AUTO: 19 % (ref 13–45)
MCH RBC QN AUTO: 26.6 PG (ref 27–33.4)
MCH RBC QN AUTO: 26.7 PG (ref 27–33.4)
MCHC RBC AUTO-ENTMCNC: 33.3 G/DL (ref 32–36)
MCHC RBC AUTO-ENTMCNC: 33.4 G/DL (ref 32–36)
MCV RBC AUTO: 80 FL (ref 80–97)
MCV RBC AUTO: 80 FL (ref 80–97)
MONOCYTES # BLD AUTO: 1 10^3/UL (ref 0.1–1.4)
MONOCYTES # BLD AUTO: 1 10^3/UL (ref 0.1–1.4)
MONOCYTES NFR BLD AUTO: 7 % (ref 3–13)
MONOCYTES NFR BLD AUTO: 8 % (ref 3–13)
NEUTROPHILS # BLD AUTO: 10.3 10^3/UL (ref 1.7–8.2)
NEUTROPHILS # BLD AUTO: 8.5 10^3/UL (ref 1.7–8.2)
NEUTS SEG NFR BLD AUTO: 71.1 % (ref 42–78)
NEUTS SEG NFR BLD AUTO: 73.4 % (ref 42–78)
PHOSPHATE SERPL-MCNC: 4.8 MG/DL (ref 2.5–4.5)
PLATELET # BLD: 234 10^3/UL (ref 150–450)
PLATELET # BLD: 274 10^3/UL (ref 150–450)
POTASSIUM SERPL-SCNC: 4.3 MMOL/L (ref 3.6–5)
PROT SERPL-MCNC: 7.3 G/DL (ref 6.3–8.2)
PROTHROMBIN TIME: 16 SEC (ref 11.4–15.4)
PROTHROMBIN TIME: 17.1 SEC (ref 11.4–15.4)
RBC # BLD AUTO: 4.42 10^6/UL (ref 3.72–5.28)
RBC # BLD AUTO: 4.77 10^6/UL (ref 3.72–5.28)
TOTAL CELLS COUNTED % (AUTO): 100 %
TOTAL CELLS COUNTED % (AUTO): 100 %
TROPONIN I SERPL-MCNC: < 0.012 NG/ML
TROPONIN I SERPL-MCNC: < 0.012 NG/ML
WBC # BLD AUTO: 12 10^3/UL (ref 4–10.5)
WBC # BLD AUTO: 14.1 10^3/UL (ref 4–10.5)

## 2020-11-23 PROCEDURE — 71045 X-RAY EXAM CHEST 1 VIEW: CPT

## 2020-11-23 PROCEDURE — 84443 ASSAY THYROID STIM HORMONE: CPT

## 2020-11-23 PROCEDURE — 85610 PROTHROMBIN TIME: CPT

## 2020-11-23 PROCEDURE — 87077 CULTURE AEROBIC IDENTIFY: CPT

## 2020-11-23 PROCEDURE — 71275 CT ANGIOGRAPHY CHEST: CPT

## 2020-11-23 PROCEDURE — 80307 DRUG TEST PRSMV CHEM ANLYZR: CPT

## 2020-11-23 PROCEDURE — 36415 COLL VENOUS BLD VENIPUNCTURE: CPT

## 2020-11-23 PROCEDURE — 82550 ASSAY OF CK (CPK): CPT

## 2020-11-23 PROCEDURE — 83735 ASSAY OF MAGNESIUM: CPT

## 2020-11-23 PROCEDURE — 87150 DNA/RNA AMPLIFIED PROBE: CPT

## 2020-11-23 PROCEDURE — 83690 ASSAY OF LIPASE: CPT

## 2020-11-23 PROCEDURE — 83036 HEMOGLOBIN GLYCOSYLATED A1C: CPT

## 2020-11-23 PROCEDURE — 84100 ASSAY OF PHOSPHORUS: CPT

## 2020-11-23 PROCEDURE — 96365 THER/PROPH/DIAG IV INF INIT: CPT

## 2020-11-23 PROCEDURE — 84439 ASSAY OF FREE THYROXINE: CPT

## 2020-11-23 PROCEDURE — C9803 HOPD COVID-19 SPEC COLLECT: HCPCS

## 2020-11-23 PROCEDURE — 70450 CT HEAD/BRAIN W/O DYE: CPT

## 2020-11-23 PROCEDURE — 93010 ELECTROCARDIOGRAM REPORT: CPT

## 2020-11-23 PROCEDURE — 82962 GLUCOSE BLOOD TEST: CPT

## 2020-11-23 PROCEDURE — 81001 URINALYSIS AUTO W/SCOPE: CPT

## 2020-11-23 PROCEDURE — 82553 CREATINE MB FRACTION: CPT

## 2020-11-23 PROCEDURE — 80061 LIPID PANEL: CPT

## 2020-11-23 PROCEDURE — 87186 SC STD MICRODIL/AGAR DIL: CPT

## 2020-11-23 PROCEDURE — 80053 COMPREHEN METABOLIC PANEL: CPT

## 2020-11-23 PROCEDURE — 82140 ASSAY OF AMMONIA: CPT

## 2020-11-23 PROCEDURE — 99285 EMERGENCY DEPT VISIT HI MDM: CPT

## 2020-11-23 PROCEDURE — 82150 ASSAY OF AMYLASE: CPT

## 2020-11-23 PROCEDURE — 84484 ASSAY OF TROPONIN QUANT: CPT

## 2020-11-23 PROCEDURE — 96375 TX/PRO/DX INJ NEW DRUG ADDON: CPT

## 2020-11-23 PROCEDURE — 87040 BLOOD CULTURE FOR BACTERIA: CPT

## 2020-11-23 PROCEDURE — 80162 ASSAY OF DIGOXIN TOTAL: CPT

## 2020-11-23 PROCEDURE — 85027 COMPLETE CBC AUTOMATED: CPT

## 2020-11-23 PROCEDURE — 85730 THROMBOPLASTIN TIME PARTIAL: CPT

## 2020-11-23 PROCEDURE — 85025 COMPLETE CBC W/AUTO DIFF WBC: CPT

## 2020-11-23 PROCEDURE — 93005 ELECTROCARDIOGRAM TRACING: CPT

## 2020-11-23 PROCEDURE — 94640 AIRWAY INHALATION TREATMENT: CPT

## 2020-11-23 PROCEDURE — 83605 ASSAY OF LACTIC ACID: CPT

## 2020-11-23 RX ADMIN — HEPARIN SODIUM PRN MLS/HR: 10000 INJECTION, SOLUTION INTRAVENOUS at 21:12

## 2020-11-23 NOTE — PDOC H&P
History of Present Illness


Admission Date/PCP: 


  11/23/20 18:50





  GISELA DYKES MD





History of Present Illness: 


SHUN SUAREZ is a 65 year old female, She had a stroke on 09/21/2020 while she

was in [place], this was treated with thrombolytic followed by thrombectomy 

complicated with cerebral edema and midline shift treated with decompressive 

hemicraniotomy on 09/22/2020.  She was admitted previously for evaluation and 

management of paroxysmal atrial fibrillation with rapid ventricular  

response.She came to the emergency room for evaluation of shortness of breath, 

hemoptysis, she said the symptoms has progressively worsen in the last couple of

days.  In the emergency room she had CT angiogram of the chest, it demonstrated 

patchy areas of consolidation in the right lower lobe with a small left pleural 

effusion also found was extensive filling defect in the right main, upper, 

middle , and lower lobe, lobar and segmental pulmonary arteries, small amount of

thrombus in the left lower lobe pulmonary artery.  A rapid SARS-CoV-2 test was 

negative, a confirmatory PCR test was also negative.








Past Medical History


Cardiac Medical History: Reports: Atrial Fibrillation, Hypertension


Pulmonary Medical History: Reports: Asthma


Neurological Medical History: Reports: Ischemic CVA


Endocrine Medical History: Reports: Diabetes Mellitus Type 2


Psychiatric Medical History: Reports: Depression





Past Surgical History


Past Surgical History: Reports: Tubal Ligation





Social History


Smoking Status: Never Smoker


Frequency of Alcohol Use: None


Hx Recreational Drug Use: No


Drugs: None


Hx Prescription Drug Abuse: No





Family History


Family History: Reviewed & Not Pertinent


Parental Family History Reviewed: Yes


Children Family History Reviewed: Yes


Sibling(s) Family History Reviewed.: Yes





Medication/Allergy


Home Medications: 








Albuterol Sulfate [Ventolin Hfa 8 gm Mdi] 1 puff IH Q4HP PRN 10/08/20 


Aspirin [Aspirin 325 mg Tablet] 325 mg PO DAILY 10/08/20 


Montelukast Sodium [Singulair 10 mg Tablet] 10 mg PO QHS 10/08/20 


Pantoprazole Sodium [Protonix 40 mg Dr Tablet] 40 mg PO QAM 10/08/20 


Pravastatin Sodium 40 mg PO QHS 10/08/20 


Sennosides/Docusate 8.6-50 mg [Senna Plus Tablet] 2 tab PO HSP PRN 10/08/20 


Sucralfate [Carafate 1 gm Tablet] 1 gm PO BID 10/08/20 


Venlafaxine HCl ER [Effexor Xr 75 mg Cap.sr] 75 mg PO DAILY 10/08/20 


Ergocalciferol (Vitamin D2) [Vitamin D2] 50,000 unit PO MIKE@1000 10/15/20 


Digoxin [Lanoxin 0.125 mg Tablet] 0.125 mg PO DAILY #90 tablet 10/23/20 


Metoprolol Tartrate [Lopressor 50 mg Tablet] 75 mg PO Q12 #60 tablet 10/23/20 


Acetaminophen [Tylenol 325 mg Tablet] 650 mg PO Q4HP PRN 11/24/20 


Hydrochlorothiazide [Hydrodiuril 12.5 mg Tablet] 12.5 mg PO DAILY 11/24/20 


Polyethylene Glycol 3350 [Miralax Powder 17 gm/Packet] 17 gm PO DAILY 11/24/20 








Allergies/Adverse Reactions: 


                                        





baclofen Allergy (Verified 10/07/20 20:12)


   


morphine Allergy (Verified 10/07/20 20:12)


   











Review of Systems


Constitutional: ABSENT: chills, fever(s), headache(s), weight gain, weight loss


Eyes: ABSENT: visual disturbances


Ears: ABSENT: hearing changes


Cardiovascular: PRESENT: dyspnea on exertion, palpitations


Respiratory: PRESENT: dyspnea, hemoptysis


Gastrointestinal: ABSENT: abdominal pain, constipation, diarrhea, hematemesis, 

hematochezia, nausea, vomiting


Genitourinary: ABSENT: dysuria, hematuria


Musculoskeletal: ABSENT: joint swelling


Integumentary: ABSENT: rash, wounds


Neurological: ABSENT: abnormal gait, abnormal speech, confusion, dizziness, 

focal weakness, syncope


Psychiatric: ABSENT: anxiety, depression, homidical ideation, suicidal ideation


Endocrine: ABSENT: cold intolerance, heat intolerance, menstrual abnormalities, 

polydipsia, polyuria


Hematologic/Lymphatic: ABSENT: easy bleeding, easy bruising, lymphadenopathy





Physical Exam


Vital Signs: 


                                        











Temp Pulse Resp BP Pulse Ox


 


 97.5 F      23 H  165/102 H  92 


 


 11/23/20 16:52     11/23/20 21:32  11/23/20 21:32  11/23/20 21:32








                                 Intake & Output











 11/22/20 11/23/20 11/24/20





 06:59 06:59 06:59


 


Intake Total   50


 


Balance   50


 


Weight   109.5 kg











General appearance: PRESENT: no acute distress


Head exam: PRESENT: atraumatic, normocephalic


Eye exam: PRESENT: PERRLA


Ear exam: PRESENT: normal external ear exam


Mouth exam: PRESENT: moist, tongue midline


Neck exam: PRESENT: full ROM


Respiratory exam: PRESENT: clear to auscultation nando


Cardiovascular exam: PRESENT: RRR, +S1, +S2


Vascular exam: PRESENT: normal capillary refill


GI/Abdominal exam: PRESENT: normal bowel sounds, soft


Rectal exam: PRESENT: deferred


Neurological exam: PRESENT: alert, motor sensory deficit


Psychiatric exam: PRESENT: appropriate affect, normal mood


Skin exam: PRESENT: dry, intact, warm.  ABSENT: cyanosis, rash





Results


Laboratory Results: 


                                        





                                 11/23/20 13:34 





                                 11/23/20 13:34 





                                        











  11/23/20 11/23/20 11/23/20





  13:02 13:34 13:34


 


WBC   14.1 H 


 


RBC   4.77 


 


Hgb   12.7 


 


Hct   38.2 


 


MCV   80 


 


MCH   26.7 L 


 


MCHC   33.4 


 


RDW   17.1 H 


 


Plt Count   274 


 


Seg Neutrophils %   73.4 


 


Sodium    139.5


 


Potassium    4.3


 


Chloride    104


 


Carbon Dioxide    25


 


Anion Gap    11


 


BUN    10


 


Creatinine    0.45 L


 


Est GFR ( Amer)    > 60


 


Glucose    138 H


 


Lactic Acid  1.5  


 


Calcium    9.4


 


Total Bilirubin    0.6


 


AST    22


 


Alkaline Phosphatase    80


 


Total Protein    7.3


 


Albumin    3.6








                                        











  11/23/20 11/23/20 11/23/20





  13:34 13:34 15:49


 


Creatine Kinase  46  


 


CK-MB (CK-2)   < 0.22 


 


Troponin I   < 0.012  < 0.012











Impressions: 


                                        





Chest X-Ray  11/23/20 00:00


IMPRESSION:  Right lower lobe airspace disease consistent with pneumonia.  

Stable mild cardiomegaly.


 








Chest/Abdomen CTA  11/23/20 15:59


IMPRESSION:


1. Extensive right pulmonary artery emboli.  There are CT findings of right 

heart strain.


2. Enlarged main pulmonary artery which can be seen with pulmonary arterial 

hypertension.


3. Right lower lobe patchy areas of consolidation which may represent 

infectious/inflammatory process or pulmonary infarct.  Small right pleural 

effusion.


 














Assessment & Plan





- Diagnosis


(1) Pulmonary embolism


Qualifiers: 


   Pulmonary embolism type: other   Chronicity: acute   Acute cor pulmonale 

presence: with acute cor pulmonale   Qualified Code(s): I26.09 - Other pulmonary

embolism with acute cor pulmonale   


Is this a current diagnosis for this admission?: Yes   


Plan: 


She has massive pulmonary embolism with evidence of right heart strain she also 

recently had right hemicraniotomy in September 2020 ,the use of anticoagulation 

in this setting carries a potential risk of intracranial hemorrhage but there is

no choice in this case with the burden of clot in the pulmonary arterial circu

lation patient need to be anticoagulated, presently hemodynamically stable.  

There is no indication to evaluate for thrombophilia because it would not change

management plan.  She has atrial fibrillation, she needs chronic anticoagulant 

anyway but this was held back because of the recent craniotomy.  She will be on 

lifelong anticoagulation








(2) Paroxysmal atrial fibrillation


Is this a current diagnosis for this admission?: Yes   





(3) S/P craniotomy


Is this a current diagnosis for this admission?: Yes   





- Time


Time Spent: Greater than 70 Minutes


Medications reviewed and adjusted accordingly: Yes


Anticipated Discharge Disposition: Home, Self Care


Anticipated Discharge Timeframe: 7 days 





- Inpatient Certification


Based on my medical assessment, after consideration of the patient's 

comorbidities, presenting symptoms, or acuity I expect that the services needed 

warrant INPATIENT care.: Yes


I certify that my determination is in accordance with my understanding of 

Medicare's requirements for reasonable and necessary INPATIENT services [42 CFR 

412.3e].: Yes

## 2020-11-23 NOTE — RADIOLOGY REPORT (SQ)
EXAM DESCRIPTION:  CTA CHEST



IMAGES COMPLETED DATE/TIME:  11/23/2020 4:31 pm



REASON FOR STUDY:  Hemoptysis, SOB



COMPARISON:  Chest radiograph same date.



TECHNIQUE:  CT scan of the chest performed using helical scanning technique with dynamic intravenous 
contrast injection.  Images reviewed with lung, soft tissue and bone windows.  Reconstructed coronal 
and sagittal MPR images reviewed.

Additional 3 dimensional post-processing performed to develop Maximal Intensity Projection images (MI
P).  All images stored on PACS.

All CT scanners at this facility use dose modulation, iterative reconstruction, and/or weight based d
osing when appropriate to reduce radiation dose to as low as reasonably achievable (ALARA).

[facility]: Dose Right  [facility]: CareDose    [facility]: Dose Right    [facility]: Teradose 4D    [facility]: Smart Technologies



CONTRAST TYPE AND DOSE:  contrast/concentration: Isovue 350.00 mmol/ml; Total Contrast Delivered: 71.
0 ml; Total Saline Delivered: 46.6 ml

Contrast bolus optimized for the pulmonary arteries. Not diagnostic for the aorta.



RENAL FUNCTION:  GFR > 60.



RADIATION DOSE:  CT Rad equipment meets quality standard of care and radiation dose reduction techniq
ues were employed. CTDIvol: 33.1 - 38.2 mGy. DLP: 1335 mGy-cm. .



LIMITATIONS:  None.



FINDINGS:  LUNGS AND PLEURA: The trachea has normal caliber and appearance.  There are patchy areas o
f consolidation in the right lower lobe with a small right pleural effusion.  Respiratory motion obsc
ures detail.  No pneumothorax.

AORTA AND GREAT VESSELS: No aneurysm.  Contrast bolus not optimized for the aorta.

HEART: The heart has normal size.  The RV/LV ratio is 4.8/3.6 or 1.33. No significant coronary artery
 calcifications.

PULMONARY ARTERIES: There is extensive filling defect in the right main, upper, middle and lower lobe
, lobar and segmental pulmonary arteries.  Small amount of thrombus in the left lower lobar pulmonary
 artery.  The main pulmonary artery is dilated measuring 4.3 cm diameter.

HILAR AND MEDIASTINAL STRUCTURES: No identified masses or abnormal nodes.

HARDWARE: None in the chest.

UPPER ABDOMEN: No significant findings.  Limited exam.

THYROID AND OTHER SOFT TISSUES: No masses.  No adenopathy.

BONES: No acute or significant finding.

3D MIPS: Confirm above findings.

OTHER: No other significant finding.



IMPRESSION:

1. Extensive right pulmonary artery emboli.  There are CT findings of right heart strain.

2. Enlarged main pulmonary artery which can be seen with pulmonary arterial hypertension.

3. Right lower lobe patchy areas of consolidation which may represent infectious/inflammatory process
 or pulmonary infarct.  Small right pleural effusion.



COMMENT:  Findings discussed with RITCHIE Yee on 11/23/2020 at 1805 hours

Quality ID # [id number]: Final reports with documentation of one or more dose reduction techniques (e.g., Au
tomated exposure control, adjustment of the mA and/or kV according to patient size, use of iterative 
reconstruction technique)



TECHNICAL DOCUMENTATION:  JOB ID:  [id number]

 2011 [employer]- All Rights Reserved



[place] location - IP/workstation name: [email or web address]

## 2020-11-23 NOTE — EKG REPORT
SEVERITY:- ABNORMAL ECG -

ATRIAL FIBRILLATION

BORDERLINE T WAVE ABNORMALITIES

:

Confirmed by: Salomon Alcala MD 23-Nov-2020 17:31:53

## 2020-11-23 NOTE — ER DOCUMENT REPORT
ED Respiratory Problem





- General


Chief Complaint: Shortness Of Breath


Stated Complaint: SHORTNESS OF BREATH


Time Seen by Provider: 11/23/20 14:53


Primary Care Provider: 


GISELA DYKES MD [Primary Care Provider] - Follow up as needed


TRAVEL OUTSIDE OF THE [place] IN LAST 30 DAYS: No





- HPI


Notes: 





65-year-old female to the emergency department via EMS with complaints of 

shortness of breath that has gotten progressively worse over the weekend.  She 

states it started earlier in the weekend and she just brushed it off of 

something like a cold.  However today she got acutely more short of breath and 

started to see hemoptysis when she is coughing.  She denies any fevers.  She 

denies any nausea, vomiting.  She denies any possible COVID-19 contacts.  She 

did have a negative rapid swab with EMS.  She does have a past medical history 

for hemorrhagic stroke with evacuation of thrombus approximately 1 year ago.  

She is followed by Dr. Dykes.  She is never had a clot in her lungs.  She is

not any blood thinners.  She is predominantly bedbound since she had a 

hemorrhagic stroke and she has residual left-sided weakness.  She states that 

has not gotten worse.  She admits that it hurts more when she is trying to take 

a deep breath or cough.





- Related Data


Allergies/Adverse Reactions: 


                                        





baclofen Allergy (Verified 10/07/20 20:12)


   


morphine Allergy (Verified 10/07/20 20:12)


   











Past Medical History





- General


Information source: Patient





- Social History


Smoking Status: Never Smoker


Frequency of alcohol use: None


Drug Abuse: None


Family History: Reviewed & Not Pertinent





- Past Medical History


Cardiac Medical History: Reports: Hx Atrial Fibrillation, Hx Hypertension


Pulmonary Medical History: Reports: Hx Asthma


Neurological Medical History: Reports: Hx Cerebrovascular Accident - s/p 

thrombectomy and decompression craniotomy


Endocrine Medical History: Reports: Hx Diabetes Mellitus Type 2


Psychiatric Medical History: Reports: Hx Depression


Past Surgical History: Reports: Hx Neurologic Surgery - Right-sided 

decompression craniotomy, Hx Tubal Ligation





Review of Systems





- Review of Systems


Constitutional: denies: Chills, Fever


EENT: No symptoms reported


Cardiovascular: Chest pain.  denies: Palpitations, Heart racing, Orthopnea, 

Dyspnea, Syncope, Dizziness, Lightheaded


Respiratory: Cough, Hemoptysis, Short of breath


Gastrointestinal: denies: Abdominal pain, Diarrhea, Nausea, Vomiting


Genitourinary: No symptoms reported


Female Genitourinary: No symptoms reported


Musculoskeletal: No symptoms reported


Skin: No symptoms reported


Hematologic/Lymphatic: No symptoms reported


Neurological/Psychological: No symptoms reported


-: Yes All other systems reviewed and negative





Physical Exam





- Vital signs


Vitals: 


                                        











Resp Pulse Ox


 


 29 H  94 


 


 11/23/20 12:53  11/23/20 12:53











Interpretation: Tachypneic





- General


General appearance: Alert


In distress: Mild


Notes: 





Mild distress.  Patient keeps trying to clear her throat try to get a good deep 

breath.  She is tachypneic.





- HEENT


Head: Normocephalic, Atraumatic


Eyes: Normal


Pupils: PERRL


Neck: Normal, Supple.  No: Lymphadenopathy





- Respiratory


Respiratory status: No respiratory distress, Tachypnea


Chest status: Nontender.  No: Accessory muscle use


Breath sounds: Normal, Wheezing - Faint expiratory wheezes in bilateral lower 

lung fields.  Very poor inspiratory effort and decreased air movement.  No: 

Rales, Rhonchi


Chest palpation: Normal





- Cardiovascular


Rhythm: Regular


Heart sounds: Normal auscultation


Murmur: No





- Abdominal


Inspection: Normal


Distension: No distension


Bowel sounds: Normal


Tenderness: Nontender.  No: Tender, McBurney's point, Molina's sign, Guarding, 

Rebound


Organomegaly: No organomegaly





- Back


Back: Normal, Nontender





- Extremities


General upper extremity: Normal inspection, Nontender, Normal color, Normal ROM,

Normal temperature


General lower extremity: Normal inspection, Nontender, Normal color, Normal ROM,

Normal temperature, Normal weight bearing.  No: Diana's sign





- Neurological


Neuro grossly intact: Yes


Cognition: Normal


Orientation: AAOx4


[place] Coma Scale Eye Opening: Spontaneous


[place] Coma Scale Verbal: Oriented


Kyle Coma Scale Motor: Obeys Commands


Kyle Coma Scale Total: 15


Speech: Normal


Cranial nerves: Normal


Sensory: Normal


Notes: 





Patient with residual left-sided weakness from her stroke.  She has very little 

movement of the left arm and lower leg.  She states that it is at its baseline. 

She has full range of motion in her right upper extremity and right lower 

extremity against resistance.  Her sensation is completely intact on the side.  

She has no facial droop.





- Psychological


Associated symptoms: Normal affect, Normal mood





- Skin


Skin Temperature: Warm


Skin Moisture: Dry


Skin Color: Normal





Course





- Re-evaluation


Re-evalutation: 





11/23/20 18:37


Received phone call from radiology and also discussed with my ER attending, Dr. Bryson, about patient's CTA with evidence for right main PE with right heart 

strain.  Also with patchy opacities may be infectious.  Patient had a negative 

Covid screen with EMS.  Will start heparin and admit patient.





Spoke with Dr. Dykes.  He agrees with plan for admission.  He would like for

the patient to go to IMCU.  He is aware of heparin and agrees with that plan.  

Patient did have a history of a hemorrhagic stroke with evacuation approximately

a year ago.  However we both agree that the PE needs attention.  I also advised 

of the patchy opacities concerning for infection.  He is aware of the negative 

rapid with EMS but would like for me to reswab her.  Like her to go to a Covid 

unit on IMCU.  He is aware that I also covered her with azithromycin and 

Rocephin.  Patient is aware of admission and agrees with plan.











- Vital Signs


Vital signs: 


                                        











Temp Pulse Resp BP Pulse Ox


 


 97.5 F      35 H  148/100 H  95 


 


 11/23/20 16:52     11/23/20 17:01  11/23/20 17:01  11/23/20 17:01














- Laboratory


Result Diagrams: 


                                 11/23/20 13:34





                                 11/23/20 13:34


Laboratory results interpreted by me: 


                                        











  11/23/20 11/23/20





  13:34 13:34


 


WBC  14.1 H 


 


MCH  26.7 L 


 


RDW  17.1 H 


 


Absolute Neuts (auto)  10.3 H 


 


Creatinine   0.45 L


 


Glucose   138 H














- Diagnostic Test


Radiology reviewed: Image reviewed, Reports reviewed





- EKG Interpretation by Me


Additional EKG results interpreted by me: 





11/23/20 16:02


Rate: 89


Rhythm: Atrial fib


Interpretation: No STEMI, rate controlled atrial fibrillation and not 

significantly different from prior on 10/17/2020





Discharge





- Discharge


Clinical Impression: 


 Shortness of breath, Pulmonary embolism on right, Lung consolidation, Suspected

COVID-19 virus infection





Condition: Stable


Disposition: ADMITTED AS INPATIENT


Admitting Provider: Andrea


Unit Admitted: [facility]


Referrals: 


GISELA DYKES MD [Primary Care Provider] - Follow up as needed

## 2020-11-23 NOTE — RADIOLOGY REPORT (SQ)
EXAM DESCRIPTION:  CHEST SINGLE VIEW



IMAGES COMPLETED DATE/TIME:  11/23/2020 2:17 pm



REASON FOR STUDY:  CP/SOB



COMPARISON:  10/22/2020



EXAM PARAMETERS:  NUMBER OF VIEWS: One view.

TECHNIQUE: Single frontal radiographic view of the chest acquired.

RADIATION DOSE: NA

LIMITATIONS: None.



FINDINGS:  LUNGS AND PLEURA: Right lower lobe airspace disease consistent with atelectasis or pneumon
ia.  No pneumothorax.

MEDIASTINUM AND HILAR STRUCTURES: No masses.  Contour normal.

HEART AND VASCULAR STRUCTURES: Cardiomegaly.  No failure.

BONES: No acute findings.

HARDWARE: None in the chest.

OTHER: No other significant finding.



IMPRESSION:  Right lower lobe airspace disease consistent with pneumonia.  Stable mild cardiomegaly.



TECHNICAL DOCUMENTATION:  JOB ID:  [id number]

 2011 [employer]- All Rights Reserved



Reading location - IP/workstation name: [email or web address]

## 2020-11-24 LAB
ALBUMIN SERPL-MCNC: 3.4 G/DL (ref 3.5–5)
ALP SERPL-CCNC: 80 U/L (ref 38–126)
ANION GAP SERPL CALC-SCNC: 12 MMOL/L (ref 5–19)
APPEARANCE UR: CLEAR
APTT PPP: (no result) S
AST SERPL-CCNC: 17 U/L (ref 14–36)
BARBITURATES UR QL SCN: NEGATIVE
BILIRUB DIRECT SERPL-MCNC: 0.2 MG/DL (ref 0–0.4)
BILIRUB SERPL-MCNC: 0.4 MG/DL (ref 0.2–1.3)
BILIRUB UR QL STRIP: NEGATIVE
BUN SERPL-MCNC: 11 MG/DL (ref 7–20)
CALCIUM: 9.1 MG/DL (ref 8.4–10.2)
CHLORIDE SERPL-SCNC: 104 MMOL/L (ref 98–107)
CHOLEST SERPL-MCNC: 127.77 MG/DL (ref 0–200)
CK MB SERPL-MCNC: < 0.22 NG/ML (ref ?–4.55)
CK MB SERPL-MCNC: < 0.22 NG/ML (ref ?–4.55)
CK SERPL-CCNC: 28 U/L (ref 30–135)
CO2 SERPL-SCNC: 24 MMOL/L (ref 22–30)
ERYTHROCYTE [DISTWIDTH] IN BLOOD BY AUTOMATED COUNT: 17.2 % (ref 11.5–14)
FREE T4 (FREE THYROXINE): 1.81 NG/DL (ref 0.78–2.19)
GLUCOSE SERPL-MCNC: 159 MG/DL (ref 75–110)
GLUCOSE UR STRIP-MCNC: NEGATIVE MG/DL
HCT VFR BLD CALC: 35.9 % (ref 36–47)
HGB BLD-MCNC: 12 G/DL (ref 12–15.5)
KETONES UR STRIP-MCNC: NEGATIVE MG/DL
LDLC SERPL DIRECT ASSAY-MCNC: 70 MG/DL (ref ?–100)
MCH RBC QN AUTO: 26.8 PG (ref 27–33.4)
MCHC RBC AUTO-ENTMCNC: 33.3 G/DL (ref 32–36)
MCV RBC AUTO: 81 FL (ref 80–97)
METHADONE UR QL SCN: NEGATIVE
NITRITE UR QL STRIP: NEGATIVE
PCP UR QL SCN: NEGATIVE
PH UR STRIP: 5 [PH] (ref 5–9)
PLATELET # BLD: 237 10^3/UL (ref 150–450)
POTASSIUM SERPL-SCNC: 3.6 MMOL/L (ref 3.6–5)
PROT SERPL-MCNC: 6.9 G/DL (ref 6.3–8.2)
PROT UR STRIP-MCNC: 30 MG/DL
RBC # BLD AUTO: 4.46 10^6/UL (ref 3.72–5.28)
SP GR UR STRIP: 1.05
TRIGL SERPL-MCNC: 137 MG/DL (ref ?–150)
TROPONIN I SERPL-MCNC: < 0.012 NG/ML
TROPONIN I SERPL-MCNC: < 0.012 NG/ML
TSH SERPL-ACNC: 0.89 UIU/ML (ref 0.47–4.68)
URINE AMPHETAMINES SCREEN: NEGATIVE
URINE BENZODIAZEPINES SCREEN: NEGATIVE
URINE COCAINE SCREEN: NEGATIVE
URINE MARIJUANA (THC) SCREEN: (no result)
UROBILINOGEN UR-MCNC: 4 MG/DL (ref ?–2)
VLDLC SERPL CALC-MCNC: 27 MG/DL (ref 10–31)
WBC # BLD AUTO: 11 10^3/UL (ref 4–10.5)

## 2020-11-24 RX ADMIN — ATORVASTATIN CALCIUM SCH MG: 10 TABLET, FILM COATED ORAL at 22:02

## 2020-11-24 RX ADMIN — SUCRALFATE SCH GM: 1 TABLET ORAL at 22:00

## 2020-11-24 RX ADMIN — MONTELUKAST SODIUM SCH MG: 10 TABLET, FILM COATED ORAL at 22:01

## 2020-11-24 RX ADMIN — HEPARIN SODIUM PRN MLS/HR: 10000 INJECTION, SOLUTION INTRAVENOUS at 06:02

## 2020-11-24 RX ADMIN — PANTOPRAZOLE SODIUM SCH MG: 40 TABLET, DELAYED RELEASE ORAL at 22:02

## 2020-11-24 RX ADMIN — METOPROLOL TARTRATE SCH MG: 50 TABLET, FILM COATED ORAL at 22:02

## 2020-11-24 RX ADMIN — VENLAFAXINE HYDROCHLORIDE SCH MG: 75 CAPSULE, EXTENDED RELEASE ORAL at 23:43

## 2020-11-24 RX ADMIN — DIGOXIN SCH MG: 0.12 TABLET ORAL at 22:01

## 2020-11-24 NOTE — PDOC PROGRESS REPORT
Subjective


Date:: 11/24/20


Subjective:: 





Patient was admitted yesterday when she presented with massive pulmonary embolis

m, presently on anticoagulation with IV heparin, stable hemodynamically


Reason For Visit: 


SHORTNESS OF BREATH,PULMONARY EMBOLISM ON RIGHT








Physical Exam


Vital Signs: 


                                        











Temp Pulse Resp BP Pulse Ox


 


 98.5 F   134 H  20   139/83 H  91 L


 


 11/24/20 07:59  11/24/20 14:00  11/24/20 13:56  11/24/20 07:59  11/24/20 13:56








                                 Intake & Output











 11/23/20 11/24/20 11/25/20





 06:59 06:59 06:59


 


Intake Total  225 


 


Balance  225 


 


Weight  109.5 kg 











General appearance: PRESENT: no acute distress


Eye exam: PRESENT: PERRLA


Respiratory exam: PRESENT: clear to auscultation nando


Cardiovascular exam: PRESENT: +S1, +S2


GI/Abdominal exam: PRESENT: soft


Neurological exam: PRESENT: alert, CN II-XII grossly intact





Results


Laboratory Results: 


                                        





                                 11/24/20 04:06 





                                 11/24/20 04:06 





                                        











  11/23/20 11/23/20 11/23/20





  22:30 22:30 22:30


 


WBC   


 


RBC   


 


Hgb   


 


Hct   


 


MCV   


 


MCH   


 


MCHC   


 


RDW   


 


Plt Count   


 


Seg Neutrophils %   


 


Sodium   


 


Potassium   


 


Chloride   


 


Carbon Dioxide   


 


Anion Gap   


 


BUN   


 


Creatinine   


 


Est GFR (African Amer)   


 


Glucose   


 


Calcium   


 


Phosphorus  4.8 H  


 


Magnesium  2.0  


 


Total Bilirubin   


 


AST   


 


Alkaline Phosphatase   


 


Ammonia   9.0 


 


Total Protein   


 


Albumin   


 


Triglycerides   


 


Cholesterol   


 


LDL Cholesterol Direct   


 


VLDL Cholesterol   


 


HDL Cholesterol   


 


Amylase  41  


 


Lipase  87.4  


 


TSH    0.89


 


Free T4    1.81


 


Urine Color   


 


Urine Appearance   


 


Urine pH   


 


Ur Specific Gravity   


 


Urine Protein   


 


Urine Glucose (UA)   


 


Urine Ketones   


 


Urine Blood   


 


Urine Nitrite   


 


Ur Leukocyte Esterase   


 


Urine WBC (Auto)   


 


Urine RBC (Auto)   














  11/23/20 11/24/20 11/24/20





  22:30 03:00 04:06


 


WBC  12.0 H  


 


RBC  4.42  


 


Hgb  11.8 L  


 


Hct  35.4 L  


 


MCV  80  


 


MCH  26.6 L  


 


MCHC  33.3  


 


RDW  17.1 H  


 


Plt Count  234  


 


Seg Neutrophils %  71.1  


 


Sodium    140.0


 


Potassium    3.6


 


Chloride    104


 


Carbon Dioxide    24


 


Anion Gap    12


 


BUN    11


 


Creatinine    0.49 L


 


Est GFR ( Amer)    > 60


 


Glucose    159 H


 


Calcium    9.1


 


Phosphorus   


 


Magnesium   


 


Total Bilirubin    0.4


 


AST    17


 


Alkaline Phosphatase    80


 


Ammonia   


 


Total Protein    6.9


 


Albumin    3.4 L


 


Triglycerides    137


 


Cholesterol    127.77


 


LDL Cholesterol Direct    70


 


VLDL Cholesterol    27.0


 


HDL Cholesterol    30 L


 


Amylase   


 


Lipase   


 


TSH   


 


Free T4   


 


Urine Color   DARK YELLOW 


 


Urine Appearance   CLEAR 


 


Urine pH   5.0 


 


Ur Specific Gravity   1.054 


 


Urine Protein   30 H 


 


Urine Glucose (UA)   NEGATIVE 


 


Urine Ketones   NEGATIVE 


 


Urine Blood   NEGATIVE 


 


Urine Nitrite   NEGATIVE 


 


Ur Leukocyte Esterase   NEGATIVE 


 


Urine WBC (Auto)   1 


 


Urine RBC (Auto)   3 














  11/24/20





  04:06


 


WBC  11.0 H


 


RBC  4.46


 


Hgb  12.0


 


Hct  35.9 L


 


MCV  81


 


MCH  26.8 L


 


MCHC  33.3


 


RDW  17.2 H


 


Plt Count  237


 


Seg Neutrophils % 


 


Sodium 


 


Potassium 


 


Chloride 


 


Carbon Dioxide 


 


Anion Gap 


 


BUN 


 


Creatinine 


 


Est GFR (African Amer) 


 


Glucose 


 


Calcium 


 


Phosphorus 


 


Magnesium 


 


Total Bilirubin 


 


AST 


 


Alkaline Phosphatase 


 


Ammonia 


 


Total Protein 


 


Albumin 


 


Triglycerides 


 


Cholesterol 


 


LDL Cholesterol Direct 


 


VLDL Cholesterol 


 


HDL Cholesterol 


 


Amylase 


 


Lipase 


 


TSH 


 


Free T4 


 


Urine Color 


 


Urine Appearance 


 


Urine pH 


 


Ur Specific Gravity 


 


Urine Protein 


 


Urine Glucose (UA) 


 


Urine Ketones 


 


Urine Blood 


 


Urine Nitrite 


 


Ur Leukocyte Esterase 


 


Urine WBC (Auto) 


 


Urine RBC (Auto) 








                                        











  11/23/20 11/23/20 11/23/20





  13:34 13:34 15:49


 


Creatine Kinase  46  


 


CK-MB (CK-2)   < 0.22 


 


Troponin I   < 0.012  < 0.012














  11/23/20 11/23/20 11/24/20





  22:30 22:30 04:06


 


Creatine Kinase  28 L   28 L


 


CK-MB (CK-2)   < 0.22 


 


Troponin I   < 0.012 














  11/24/20 11/24/20 11/24/20





  04:06 09:58 09:58


 


Creatine Kinase   23 L 


 


CK-MB (CK-2)  < 0.22   < 0.22


 


Troponin I  < 0.012   < 0.012











Impressions: 


                                        





Chest X-Ray  11/23/20 00:00


IMPRESSION:  Right lower lobe airspace disease consistent with pneumonia.  

Stable mild cardiomegaly.


 








Head CT  11/23/20 00:00


IMPRESSION:


1. Large area of encephalomalacia and gliosis consistent with prior right 

frontoparietal infarct, stable.  No evidence of acute intracranial hemorrhage, 

mass, mass effect, or evidence of acute territorial infarct.  .


2. Right frontoparietal craniotomy stable.


EVIDENCE OF ACUTE STROKE: NO.


 








Chest/Abdomen CTA  11/23/20 15:59


IMPRESSION:


1. Extensive right pulmonary artery emboli.  There are CT findings of right 

heart strain.


2. Enlarged main pulmonary artery which can be seen with pulmonary arterial 

hypertension.


3. Right lower lobe patchy areas of consolidation which may represent 

infectious/inflammatory process or pulmonary infarct.  Small right pleural 

effusion.


 














Assessment & Plan





- Diagnosis


(1) Pulmonary embolism


Qualifiers: 


   Pulmonary embolism type: other   Chronicity: acute   Acute cor pulmonale 

presence: with acute cor pulmonale   Qualified Code(s): I26.09 - Other pulmonary

embolism with acute cor pulmonale   


Is this a current diagnosis for this admission?: Yes   


Plan: 


She will continue intravenous heparin for 7 days, this will coincide with the 7-

day duration for the loading dose of Eliquis, she will be transitioned to p.o. 

Eliquis lifelong








(2) Paroxysmal atrial fibrillation


Is this a current diagnosis for this admission?: Yes   


Plan: 


Presently rate controlled








(3) S/P craniotomy


Is this a current diagnosis for this admission?: Yes   





- Time


Time Spent with patient: 25-34 minutes


Level of Care: IMCU


Medications reviewed and adjusted accordingly: Yes


Anticipated discharge: Home


Anticipated DC Timeframe: Other - 7 days

## 2020-11-24 NOTE — RADIOLOGY REPORT (SQ)
EXAM DESCRIPTION:  CT HEAD WITHOUT



IMAGES COMPLETED DATE/TIME:  11/24/2020 6:11 pm



REASON FOR STUDY:  h/o CVA.



COMPARISON:  10/15/2020.



TECHNIQUE:  Axial images acquired through the brain without intravenous contrast.  Images reviewed wi
 bone, brain and subdural windows.  Additional sagittal and coronal reconstructions were generated.
 Images stored on PACS.

All CT scanners at this facility use dose modulation, iterative reconstruction, and/or weight based d
osing when appropriate to reduce radiation dose to as low as reasonably achievable (ALARA).

[facility]: Dose Right  [facility]: CareDose    [facility]: Dose Right    [facility]: Teradose 4D    [facility]: Smart Technologies



RADIATION DOSE:   mGy.



LIMITATIONS:  None.



FINDINGS:  VENTRICLES: Normal size and contour.

CEREBRUM: Encephalomalacia and gliosis involving the right frontal parietal lobe is stable from prior
 examination.  Otherwise normal gray-white matter differentiation.  Mild patchy periventricular and d
eep white matter hypodense attenuation consistent with mild chronic small vessel ischemic change.  Th
ere is intracranial atherosclerosis. Normal gray/white matter differentiation. No areas of low densit
y in the white matter.

CEREBELLUM: No masses.  No hemorrhage.  No alteration of density.  No evidence for acute infarction.

EXTRAAXIAL SPACES: No fluid collections.  No masses.

ORBITS AND GLOBE: No intra- or extraconal masses.  Normal contour of globe without masses.

CALVARIUM: Right frontoparietal craniotomy unchanged.

PARANASAL SINUSES: No fluid or mucosal thickening.

SOFT TISSUES: No mass or hematoma.

OTHER: No other significant finding.



IMPRESSION:

1. Large area of encephalomalacia and gliosis consistent with prior right frontoparietal infarct, sta
ble.  No evidence of acute intracranial hemorrhage, mass, mass effect, or evidence of acute territori
al infarct.  .

2. Right frontoparietal craniotomy stable.

EVIDENCE OF ACUTE STROKE: NO.



COMMENT:  Quality ID # [id number]: Final reports with documentation of one or more dose reduction techniques
 (e.g., Automated exposure control, adjustment of the mA and/or kV according to patient size, use of 
iterative reconstruction technique)



TECHNICAL DOCUMENTATION:  JOB ID:  [id number]

 2011 [employer]- All Rights Reserved



Reading location - IP/workstation name: [email or web address]

## 2020-11-25 LAB
ERYTHROCYTE [DISTWIDTH] IN BLOOD BY AUTOMATED COUNT: 17.1 % (ref 11.5–14)
HCT VFR BLD CALC: 37.2 % (ref 36–47)
HGB BLD-MCNC: 12.5 G/DL (ref 12–15.5)
MCH RBC QN AUTO: 26.7 PG (ref 27–33.4)
MCHC RBC AUTO-ENTMCNC: 33.5 G/DL (ref 32–36)
MCV RBC AUTO: 80 FL (ref 80–97)
PLATELET # BLD: 275 10^3/UL (ref 150–450)
RBC # BLD AUTO: 4.67 10^6/UL (ref 3.72–5.28)
WBC # BLD AUTO: 9.5 10^3/UL (ref 4–10.5)

## 2020-11-25 RX ADMIN — INSULIN HUMAN SCH UNIT: 100 INJECTION, SOLUTION PARENTERAL at 21:36

## 2020-11-25 RX ADMIN — ATORVASTATIN CALCIUM SCH MG: 10 TABLET, FILM COATED ORAL at 21:14

## 2020-11-25 RX ADMIN — PANTOPRAZOLE SODIUM SCH MG: 40 TABLET, DELAYED RELEASE ORAL at 08:30

## 2020-11-25 RX ADMIN — INSULIN HUMAN SCH: 100 INJECTION, SOLUTION PARENTERAL at 08:12

## 2020-11-25 RX ADMIN — POLYETHYLENE GLYCOL 3350 SCH: 17 POWDER, FOR SOLUTION ORAL at 10:30

## 2020-11-25 RX ADMIN — MONTELUKAST SODIUM SCH MG: 10 TABLET, FILM COATED ORAL at 21:14

## 2020-11-25 RX ADMIN — INSULIN HUMAN SCH: 100 INJECTION, SOLUTION PARENTERAL at 17:28

## 2020-11-25 RX ADMIN — HEPARIN SODIUM PRN MLS/HR: 10000 INJECTION, SOLUTION INTRAVENOUS at 07:24

## 2020-11-25 RX ADMIN — ACETAMINOPHEN PRN MG: 325 TABLET ORAL at 21:36

## 2020-11-25 RX ADMIN — POLYETHYLENE GLYCOL 3350 SCH GM: 17 POWDER, FOR SOLUTION ORAL at 11:41

## 2020-11-25 RX ADMIN — ACETAMINOPHEN PRN MG: 325 TABLET ORAL at 16:02

## 2020-11-25 RX ADMIN — SUCRALFATE SCH GM: 1 TABLET ORAL at 10:29

## 2020-11-25 RX ADMIN — METOPROLOL TARTRATE SCH MG: 50 TABLET, FILM COATED ORAL at 21:14

## 2020-11-25 RX ADMIN — METOPROLOL TARTRATE SCH MG: 50 TABLET, FILM COATED ORAL at 10:29

## 2020-11-25 RX ADMIN — VENLAFAXINE HYDROCHLORIDE SCH MG: 75 CAPSULE, EXTENDED RELEASE ORAL at 10:29

## 2020-11-25 RX ADMIN — SUCRALFATE SCH GM: 1 TABLET ORAL at 17:30

## 2020-11-25 RX ADMIN — HEPARIN SODIUM PRN MLS/HR: 10000 INJECTION, SOLUTION INTRAVENOUS at 20:56

## 2020-11-25 RX ADMIN — DIGOXIN SCH MG: 0.12 TABLET ORAL at 10:29

## 2020-11-25 RX ADMIN — INSULIN HUMAN SCH: 100 INJECTION, SOLUTION PARENTERAL at 11:44

## 2020-11-25 NOTE — PDOC PROGRESS REPORT
Subjective


Date:: 11/25/20


Subjective:: 





Patient was admitted yesterday when she presented with massive pulmonary embolis

m, presently on anticoagulation with IV heparin, stable hemodynamically





11/25/2020


Patient seen by the bedside, she, complain of headache, CT head from yesterday 

was negative,, she is on intravenous heparin for massive pulmonary embolism


Reason For Visit: 


SHORTNESS OF BREATH,PULMONARY EMBOLISM ON RIGHT








Physical Exam


Vital Signs: 


                                        











Temp Pulse Resp BP Pulse Ox


 


 97.7 F   92   16   125/78   97 


 


 11/25/20 16:18  11/25/20 16:18  11/25/20 16:18  11/25/20 16:18  11/25/20 16:18








                                 Intake & Output











 11/24/20 11/25/20 11/26/20





 06:59 06:59 06:59


 


Intake Total 225 771 69


 


Output Total  400 


 


Balance 225 371 69


 


Weight 109.5 kg 110.9 kg 











General appearance: PRESENT: no acute distress


Eye exam: PRESENT: PERRLA


Respiratory exam: PRESENT: clear to auscultation nando


Cardiovascular exam: PRESENT: +S1, +S2


GI/Abdominal exam: PRESENT: soft





Results


Laboratory Results: 


                                        





                                 11/25/20 09:05 





                                 11/24/20 04:06 





                                        











  11/25/20





  09:05


 


WBC  9.5


 


RBC  4.67


 


Hgb  12.5


 


Hct  37.2


 


MCV  80


 


MCH  26.7 L


 


MCHC  33.5


 


RDW  17.1 H


 


Plt Count  275








                                        





11/23/20 15:13   Blood   Blood Culture (PCR) - Final


                            Staphylococcus Species





                                        











  11/23/20 11/23/20 11/23/20





  13:34 13:34 15:49


 


Creatine Kinase  46  


 


CK-MB (CK-2)   < 0.22 


 


Troponin I   < 0.012  < 0.012














  11/23/20 11/23/20 11/24/20





  22:30 22:30 04:06


 


Creatine Kinase  28 L   28 L


 


CK-MB (CK-2)   < 0.22 


 


Troponin I   < 0.012 














  11/24/20 11/24/20 11/24/20





  04:06 09:58 09:58


 


Creatine Kinase   23 L 


 


CK-MB (CK-2)  < 0.22   < 0.22


 


Troponin I  < 0.012   < 0.012











Impressions: 


                                        





Chest X-Ray  11/23/20 00:00


IMPRESSION:  Right lower lobe airspace disease consistent with pneumonia.  

Stable mild cardiomegaly.


 








Head CT  11/23/20 00:00


IMPRESSION:


1. Large area of encephalomalacia and gliosis consistent with prior right 

frontoparietal infarct, stable.  No evidence of acute intracranial hemorrhage, 

mass, mass effect, or evidence of acute territorial infarct.  .


2. Right frontoparietal craniotomy stable.


EVIDENCE OF ACUTE STROKE: NO.


 








Chest/Abdomen CTA  11/23/20 15:59


IMPRESSION:


1. Extensive right pulmonary artery emboli.  There are CT findings of right 

heart strain.


2. Enlarged main pulmonary artery which can be seen with pulmonary arterial 

hypertension.


3. Right lower lobe patchy areas of consolidation which may represent i

nfectious/inflammatory process or pulmonary infarct.  Small right pleural 

effusion.


 














Assessment & Plan





- Diagnosis


(1) Pulmonary embolism


Qualifiers: 


   Pulmonary embolism type: other   Chronicity: acute   Acute cor pulmonale 

presence: with acute cor pulmonale   Qualified Code(s): I26.09 - Other pulmonary

embolism with acute cor pulmonale   


Is this a current diagnosis for this admission?: Yes   


Plan: 


She will continue intravenous heparin for 7 days, this will coincide with the 7-

day duration for the loading dose of Eliquis, she will be transitioned to p.o. 

Eliquis lifelong








(2) Paroxysmal atrial fibrillation


Is this a current diagnosis for this admission?: Yes   


Plan: 


Presently rate controlled








(3) S/P craniotomy


Is this a current diagnosis for this admission?: Yes   





- Time


Time Spent with patient: 25-34 minutes


Level of Care: IMCU


Medications reviewed and adjusted accordingly: Yes


Anticipated discharge: Home


Anticipated DC Timeframe: Other - 7 days

## 2020-11-26 LAB
ERYTHROCYTE [DISTWIDTH] IN BLOOD BY AUTOMATED COUNT: 17.2 % (ref 11.5–14)
HCT VFR BLD CALC: 35.5 % (ref 36–47)
HGB BLD-MCNC: 11.9 G/DL (ref 12–15.5)
MCH RBC QN AUTO: 26.7 PG (ref 27–33.4)
MCHC RBC AUTO-ENTMCNC: 33.6 G/DL (ref 32–36)
MCV RBC AUTO: 79 FL (ref 80–97)
PLATELET # BLD: 250 10^3/UL (ref 150–450)
RBC # BLD AUTO: 4.48 10^6/UL (ref 3.72–5.28)
WBC # BLD AUTO: 8.7 10^3/UL (ref 4–10.5)

## 2020-11-26 RX ADMIN — ACETAMINOPHEN PRN MG: 325 TABLET ORAL at 20:34

## 2020-11-26 RX ADMIN — INSULIN HUMAN SCH: 100 INJECTION, SOLUTION PARENTERAL at 16:10

## 2020-11-26 RX ADMIN — MONTELUKAST SODIUM SCH MG: 10 TABLET, FILM COATED ORAL at 23:00

## 2020-11-26 RX ADMIN — SUCRALFATE SCH GM: 1 TABLET ORAL at 17:11

## 2020-11-26 RX ADMIN — SUCRALFATE SCH GM: 1 TABLET ORAL at 13:50

## 2020-11-26 RX ADMIN — HEPARIN SODIUM PRN MLS/HR: 10000 INJECTION, SOLUTION INTRAVENOUS at 13:52

## 2020-11-26 RX ADMIN — INSULIN HUMAN SCH: 100 INJECTION, SOLUTION PARENTERAL at 14:00

## 2020-11-26 RX ADMIN — ATORVASTATIN CALCIUM SCH MG: 10 TABLET, FILM COATED ORAL at 23:00

## 2020-11-26 RX ADMIN — METOPROLOL TARTRATE SCH MG: 50 TABLET, FILM COATED ORAL at 23:00

## 2020-11-26 RX ADMIN — PANTOPRAZOLE SODIUM SCH MG: 40 TABLET, DELAYED RELEASE ORAL at 13:55

## 2020-11-26 RX ADMIN — POLYETHYLENE GLYCOL 3350 SCH GM: 17 POWDER, FOR SOLUTION ORAL at 13:51

## 2020-11-26 RX ADMIN — INSULIN HUMAN SCH UNIT: 100 INJECTION, SOLUTION PARENTERAL at 23:00

## 2020-11-26 RX ADMIN — METOPROLOL TARTRATE SCH MG: 50 TABLET, FILM COATED ORAL at 13:50

## 2020-11-26 RX ADMIN — DIGOXIN SCH MG: 0.12 TABLET ORAL at 13:50

## 2020-11-26 RX ADMIN — INSULIN HUMAN SCH: 100 INJECTION, SOLUTION PARENTERAL at 13:59

## 2020-11-26 RX ADMIN — VENLAFAXINE HYDROCHLORIDE SCH MG: 75 CAPSULE, EXTENDED RELEASE ORAL at 13:50

## 2020-11-26 NOTE — PDOC PROGRESS REPORT
Subjective


Date:: 11/26/20


Subjective:: 





Patient was admitted yesterday when she presented with massive pulmonary embolis

m, presently on anticoagulation with IV heparin, stable hemodynamically





11/25/2020


Patient seen by the bedside, she, complain of headache, CT head from yesterday 

was negative,, she is on intravenous heparin for massive pulmonary embolism





11/26/2020


Patient was seen by the bedside She tends to keep food in her mouth for 

prolonged period of time,She will continue IV heparin for a total of 7 days


Reason For Visit: 


SHORTNESS OF BREATH,PULMONARY EMBOLISM ON RIGHT








Physical Exam


Vital Signs: 


                                        











Temp Pulse Resp BP Pulse Ox


 


 98.0 F   84   18   138/82 H  92 


 


 11/26/20 07:54  11/26/20 10:26  11/26/20 10:26  11/26/20 07:54  11/26/20 10:26








                                 Intake & Output











 11/25/20 11/26/20 11/27/20





 06:59 06:59 06:59


 


Intake Total 771 1151 368


 


Output Total 400  


 


Balance 371 1151 368


 


Weight 110.9 kg 108.7 kg 











General appearance: PRESENT: no acute distress


Eye exam: PRESENT: PERRLA


Respiratory exam: PRESENT: clear to auscultation nando


Cardiovascular exam: PRESENT: +S1, +S2


GI/Abdominal exam: PRESENT: soft


Neurological exam: PRESENT: alert





Results


Laboratory Results: 


                                        





                                 11/26/20 06:09 





                                 11/24/20 04:06 





                                        











  11/26/20





  06:09


 


WBC  8.7


 


RBC  4.48


 


Hgb  11.9 L


 


Hct  35.5 L


 


MCV  79 L


 


MCH  26.7 L


 


MCHC  33.6


 


RDW  17.2 H


 


Plt Count  250








                                        





11/23/20 15:13   Blood   Blood Culture (PCR) - Final


                            Staphylococcus Species


11/23/20 15:13   Blood   Blood Culture - Final


                            Staphylococcus Hominis





                                        











  11/23/20 11/23/20 11/23/20





  13:34 13:34 15:49


 


Creatine Kinase  46  


 


CK-MB (CK-2)   < 0.22 


 


Troponin I   < 0.012  < 0.012














  11/23/20 11/23/20 11/24/20





  22:30 22:30 04:06


 


Creatine Kinase  28 L   28 L


 


CK-MB (CK-2)   < 0.22 


 


Troponin I   < 0.012 














  11/24/20 11/24/20 11/24/20





  04:06 09:58 09:58


 


Creatine Kinase   23 L 


 


CK-MB (CK-2)  < 0.22   < 0.22


 


Troponin I  < 0.012   < 0.012











Impressions: 


                                        





Chest X-Ray  11/23/20 00:00


IMPRESSION:  Right lower lobe airspace disease consistent with pneumonia.  

Stable mild cardiomegaly.


 








Head CT  11/23/20 00:00


IMPRESSION:


1. Large area of encephalomalacia and gliosis consistent with prior right 

frontoparietal infarct, stable.  No evidence of acute intracranial hemorrhage, 

mass, mass effect, or evidence of acute territorial infarct.  .


2. Right frontoparietal craniotomy stable.


EVIDENCE OF ACUTE STROKE: NO.


 








Chest/Abdomen CTA  11/23/20 15:59


IMPRESSION:


1. Extensive right pulmonary artery emboli.  There are CT findings of right 

heart strain.


2. Enlarged main pulmonary artery which can be seen with pulmonary arterial 

hypertension.


3. Right lower lobe patchy areas of consolidation which may represent 

infectious/inflammatory process or pulmonary infarct.  Small right pleural 

effusion.


 














Assessment & Plan





- Diagnosis


(1) Pulmonary embolism


Qualifiers: 


   Pulmonary embolism type: other   Chronicity: acute   Acute cor pulmonale 

presence: with acute cor pulmonale   Qualified Code(s): I26.09 - Other pulmonary

embolism with acute cor pulmonale   


Is this a current diagnosis for this admission?: Yes   


Plan: 


She will continue intravenous heparin for 7 days, this will coincide with the 7-

day duration for the loading dose of Eliquis, she will be transitioned to p.o. 

Eliquis lifelong








(2) Paroxysmal atrial fibrillation


Is this a current diagnosis for this admission?: Yes   


Plan: 


Presently rate controlled








(3) S/P craniotomy


Is this a current diagnosis for this admission?: Yes   





(4) T2DM (type 2 diabetes mellitus)


Qualifiers: 


   Diabetes mellitus long term insulin use: without long term use   Diabetes 

mellitus complication status: with neurologic complications   Diabetes mellitus 

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 

diabetes mellitus with diabetic polyneuropathy   


Is this a current diagnosis for this admission?: Yes   





- Time


Time Spent with patient: 25-34 minutes


Level of Care: IMCU


Medications reviewed and adjusted accordingly: Yes


Anticipated discharge: Home

## 2020-11-27 LAB
APPEARANCE UR: CLEAR
APTT PPP: YELLOW S
BILIRUB UR QL STRIP: NEGATIVE
ERYTHROCYTE [DISTWIDTH] IN BLOOD BY AUTOMATED COUNT: 17.1 % (ref 11.5–14)
GLUCOSE UR STRIP-MCNC: NEGATIVE MG/DL
HCT VFR BLD CALC: 36.1 % (ref 36–47)
HGB BLD-MCNC: 12.3 G/DL (ref 12–15.5)
KETONES UR STRIP-MCNC: NEGATIVE MG/DL
MCH RBC QN AUTO: 26.8 PG (ref 27–33.4)
MCHC RBC AUTO-ENTMCNC: 33.9 G/DL (ref 32–36)
MCV RBC AUTO: 79 FL (ref 80–97)
NITRITE UR QL STRIP: NEGATIVE
PH UR STRIP: 6 [PH] (ref 5–9)
PLATELET # BLD: 260 10^3/UL (ref 150–450)
PROT UR STRIP-MCNC: NEGATIVE MG/DL
RBC # BLD AUTO: 4.57 10^6/UL (ref 3.72–5.28)
SP GR UR STRIP: 1.01
UROBILINOGEN UR-MCNC: NEGATIVE MG/DL (ref ?–2)
WBC # BLD AUTO: 9.2 10^3/UL (ref 4–10.5)

## 2020-11-27 RX ADMIN — MONTELUKAST SODIUM SCH MG: 10 TABLET, FILM COATED ORAL at 22:03

## 2020-11-27 RX ADMIN — PANTOPRAZOLE SODIUM SCH MG: 40 TABLET, DELAYED RELEASE ORAL at 08:14

## 2020-11-27 RX ADMIN — SUCRALFATE SCH GM: 1 TABLET ORAL at 09:59

## 2020-11-27 RX ADMIN — METOPROLOL TARTRATE SCH MG: 50 TABLET, FILM COATED ORAL at 09:58

## 2020-11-27 RX ADMIN — INSULIN HUMAN SCH: 100 INJECTION, SOLUTION PARENTERAL at 12:52

## 2020-11-27 RX ADMIN — HEPARIN SODIUM PRN MLS/HR: 10000 INJECTION, SOLUTION INTRAVENOUS at 15:06

## 2020-11-27 RX ADMIN — POLYETHYLENE GLYCOL 3350 SCH: 17 POWDER, FOR SOLUTION ORAL at 09:59

## 2020-11-27 RX ADMIN — VENLAFAXINE HYDROCHLORIDE SCH MG: 75 CAPSULE, EXTENDED RELEASE ORAL at 09:59

## 2020-11-27 RX ADMIN — ACETAMINOPHEN PRN MG: 325 TABLET ORAL at 18:25

## 2020-11-27 RX ADMIN — ACETAMINOPHEN PRN MG: 325 TABLET ORAL at 08:14

## 2020-11-27 RX ADMIN — INSULIN HUMAN SCH: 100 INJECTION, SOLUTION PARENTERAL at 16:42

## 2020-11-27 RX ADMIN — DIGOXIN SCH MG: 0.12 TABLET ORAL at 09:59

## 2020-11-27 RX ADMIN — ATORVASTATIN CALCIUM SCH MG: 10 TABLET, FILM COATED ORAL at 22:03

## 2020-11-27 RX ADMIN — INSULIN HUMAN SCH: 100 INJECTION, SOLUTION PARENTERAL at 08:10

## 2020-11-27 RX ADMIN — METOPROLOL TARTRATE SCH MG: 50 TABLET, FILM COATED ORAL at 22:04

## 2020-11-27 RX ADMIN — INSULIN HUMAN SCH UNIT: 100 INJECTION, SOLUTION PARENTERAL at 22:02

## 2020-11-27 RX ADMIN — HEPARIN SODIUM PRN MLS/HR: 10000 INJECTION, SOLUTION INTRAVENOUS at 02:16

## 2020-11-27 RX ADMIN — SUCRALFATE SCH GM: 1 TABLET ORAL at 17:35

## 2020-11-27 NOTE — PDOC PROGRESS REPORT
Subjective


Date:: 11/27/20


Subjective:: 





Patient was admitted yesterday when she presented with massive pulmonary embolis

m, presently on anticoagulation with IV heparin, stable hemodynamically





11/25/2020


Patient seen by the bedside, she, complain of headache, CT head from yesterday 

was negative,, she is on intravenous heparin for massive pulmonary embolism





11/26/2020


Patient was seen by the bedside She tends to keep food in her mouth for 

prolonged period of time,She will continue IV heparin for a total of 7 days





11/27/2020


Patient seen by the bedside, she continues on the IV heparin for a total of 7 

days and then transition to p.o. Eliquis lifelong


Reason For Visit: 


SHORTNESS OF BREATH,PULMONARY EMBOLISM ON RIGHT








Physical Exam


Vital Signs: 


                                        











Temp Pulse Resp BP Pulse Ox


 


 98.0 F   86   19   111/87 H  95 


 


 11/27/20 12:34  11/27/20 14:00  11/27/20 12:34  11/27/20 12:34  11/27/20 12:34








                                 Intake & Output











 11/26/20 11/27/20 11/28/20





 06:59 06:59 06:59


 


Intake Total 1151 839 719


 


Balance 1151 839 719


 


Weight 108.7 kg 110.3 kg 











General appearance: PRESENT: no acute distress


Eye exam: PRESENT: PERRLA


Respiratory exam: PRESENT: clear to auscultation nando


Cardiovascular exam: PRESENT: +S1, +S2


GI/Abdominal exam: PRESENT: soft





Results


Laboratory Results: 


                                        





                                 11/27/20 06:33 





                                 11/24/20 04:06 





                                        











  11/26/20 11/27/20





  17:00 06:33


 


WBC   9.2


 


RBC   4.57


 


Hgb   12.3


 


Hct   36.1


 


MCV   79 L


 


MCH   26.8 L


 


MCHC   33.9


 


RDW   17.1 H


 


Plt Count   260


 


Urine Color  YELLOW 


 


Urine Appearance  CLEAR 


 


Urine pH  6.0 


 


Ur Specific Gravity  1.014 


 


Urine Protein  NEGATIVE 


 


Urine Glucose (UA)  NEGATIVE 


 


Urine Ketones  NEGATIVE 


 


Urine Blood  SMALL H 


 


Urine Nitrite  NEGATIVE 


 


Ur Leukocyte Esterase  NEGATIVE 


 


Urine WBC (Auto)  2 


 


Urine RBC (Auto)  1 








                                        











  11/23/20 11/23/20 11/23/20





  13:34 13:34 15:49


 


Creatine Kinase  46  


 


CK-MB (CK-2)   < 0.22 


 


Troponin I   < 0.012  < 0.012














  11/23/20 11/23/20 11/24/20





  22:30 22:30 04:06


 


Creatine Kinase  28 L   28 L


 


CK-MB (CK-2)   < 0.22 


 


Troponin I   < 0.012 














  11/24/20 11/24/20 11/24/20





  04:06 09:58 09:58


 


Creatine Kinase   23 L 


 


CK-MB (CK-2)  < 0.22   < 0.22


 


Troponin I  < 0.012   < 0.012











Impressions: 


                                        





Chest X-Ray  11/23/20 00:00


IMPRESSION:  Right lower lobe airspace disease consistent with pneumonia.  

Stable mild cardiomegaly.


 








Head CT  11/23/20 00:00


IMPRESSION:


1. Large area of encephalomalacia and gliosis consistent with prior right 

frontoparietal infarct, stable.  No evidence of acute intracranial hemorrhage, 

mass, mass effect, or evidence of acute territorial infarct.  .


2. Right frontoparietal craniotomy stable.


EVIDENCE OF ACUTE STROKE: NO.


 








Chest/Abdomen CTA  11/23/20 15:59


IMPRESSION:


1. Extensive right pulmonary artery emboli.  There are CT findings of right 

heart strain.


2. Enlarged main pulmonary artery which can be seen with pulmonary arterial 

hypertension.


3. Right lower lobe patchy areas of consolidation which may represent 

infectious/inflammatory process or pulmonary infarct.  Small right pleural 

effusion.


 














Assessment & Plan





- Diagnosis


(1) Pulmonary embolism


Qualifiers: 


   Pulmonary embolism type: other   Chronicity: acute   Acute cor pulmonale 

presence: with acute cor pulmonale   Qualified Code(s): I26.09 - Other pulmonary

embolism with acute cor pulmonale   


Is this a current diagnosis for this admission?: Yes   


Plan: 


She will continue intravenous heparin for 7 days, this will coincide with the 7-

day duration for the loading dose of Eliquis, she will be transitioned to p.o. 

Eliquis lifelong








(2) Paroxysmal atrial fibrillation


Is this a current diagnosis for this admission?: Yes   


Plan: 


Presently rate controlled








(3) S/P craniotomy


Is this a current diagnosis for this admission?: Yes   





(4) T2DM (type 2 diabetes mellitus)


Qualifiers: 


   Diabetes mellitus long term insulin use: without long term use   Diabetes 

mellitus complication status: with neurologic complications   Diabetes mellitus 

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 

diabetes mellitus with diabetic polyneuropathy   


Is this a current diagnosis for this admission?: Yes   





- Time


Time Spent with patient: 25-34 minutes


Level of Care: IMCU


Medications reviewed and adjusted accordingly: Yes


Anticipated discharge: Home


Anticipated DC Timeframe: Other

## 2020-11-28 RX ADMIN — PANTOPRAZOLE SODIUM SCH MG: 40 TABLET, DELAYED RELEASE ORAL at 08:13

## 2020-11-28 RX ADMIN — SUCRALFATE SCH GM: 1 TABLET ORAL at 17:20

## 2020-11-28 RX ADMIN — HEPARIN SODIUM PRN MLS/HR: 10000 INJECTION, SOLUTION INTRAVENOUS at 03:39

## 2020-11-28 RX ADMIN — INSULIN HUMAN SCH: 100 INJECTION, SOLUTION PARENTERAL at 16:13

## 2020-11-28 RX ADMIN — INSULIN HUMAN SCH: 100 INJECTION, SOLUTION PARENTERAL at 11:00

## 2020-11-28 RX ADMIN — METOPROLOL TARTRATE SCH MG: 50 TABLET, FILM COATED ORAL at 21:43

## 2020-11-28 RX ADMIN — INSULIN HUMAN SCH: 100 INJECTION, SOLUTION PARENTERAL at 08:05

## 2020-11-28 RX ADMIN — METOPROLOL TARTRATE SCH MG: 50 TABLET, FILM COATED ORAL at 09:44

## 2020-11-28 RX ADMIN — POLYETHYLENE GLYCOL 3350 SCH: 17 POWDER, FOR SOLUTION ORAL at 10:05

## 2020-11-28 RX ADMIN — MONTELUKAST SODIUM SCH MG: 10 TABLET, FILM COATED ORAL at 21:43

## 2020-11-28 RX ADMIN — POLYETHYLENE GLYCOL 3350 SCH GM: 17 POWDER, FOR SOLUTION ORAL at 15:31

## 2020-11-28 RX ADMIN — HEPARIN SODIUM PRN MLS/HR: 10000 INJECTION, SOLUTION INTRAVENOUS at 17:20

## 2020-11-28 RX ADMIN — ACETAMINOPHEN PRN MG: 325 TABLET ORAL at 17:27

## 2020-11-28 RX ADMIN — VENLAFAXINE HYDROCHLORIDE SCH MG: 75 CAPSULE, EXTENDED RELEASE ORAL at 09:43

## 2020-11-28 RX ADMIN — INSULIN HUMAN SCH: 100 INJECTION, SOLUTION PARENTERAL at 21:10

## 2020-11-28 RX ADMIN — ATORVASTATIN CALCIUM SCH MG: 10 TABLET, FILM COATED ORAL at 21:43

## 2020-11-28 RX ADMIN — DIGOXIN SCH MG: 0.12 TABLET ORAL at 09:43

## 2020-11-28 RX ADMIN — SUCRALFATE SCH GM: 1 TABLET ORAL at 09:43

## 2020-11-28 NOTE — PDOC PROGRESS REPORT
Subjective


Date:: 11/28/20


Subjective:: 





Patient was admitted yesterday when she presented with massive pulmonary embolis

m, presently on anticoagulation with IV heparin, stable hemodynamically





11/25/2020


Patient seen by the bedside, she, complain of headache, CT head from yesterday 

was negative,, she is on intravenous heparin for massive pulmonary embolism





11/26/2020


Patient was seen by the bedside She tends to keep food in her mouth for 

prolonged period of time,She will continue IV heparin for a total of 7 days





11/27/2020


Patient seen by the bedside, she continues on the IV heparin for a total of 7 

days and then transition to p.o. Eliquis lifelong





11/28/2020


Patient seen by the bedside, no new complaints she will continue IV heparin for 

a total of 7 days, today is day #5, she has 2 days more left before t

ransitioning to p.o. Eliquis


Reason For Visit: 


SHORTNESS OF BREATH,PULMONARY EMBOLISM ON RIGHT








Physical Exam


Vital Signs: 


                                        











Temp Pulse Resp BP Pulse Ox


 


 98.9 F   89   20   133/67 H  91 L


 


 11/28/20 15:54  11/28/20 15:54  11/28/20 15:54  11/28/20 15:54  11/28/20 15:54








                                 Intake & Output











 11/27/20 11/28/20 11/29/20





 06:59 06:59 06:59


 


Intake Total 839 1392 1160


 


Output Total  650 1300


 


Balance 839 742 -140


 


Weight 110.3 kg 111.1 kg 111.1 kg











General appearance: PRESENT: no acute distress


Eye exam: PRESENT: PERRLA


Respiratory exam: PRESENT: clear to auscultation nando


Cardiovascular exam: PRESENT: +S1, +S2


GI/Abdominal exam: PRESENT: soft


Neurological exam: PRESENT: alert





Results


Laboratory Results: 


                                        





                                 11/27/20 06:33 





                                 11/24/20 04:06 





                                        





11/23/20 13:02   Blood   Blood Culture - Final


                            NO GROWTH IN 5 DAYS





                                        











  11/23/20 11/23/20 11/23/20





  13:34 13:34 15:49


 


Creatine Kinase  46  


 


CK-MB (CK-2)   < 0.22 


 


Troponin I   < 0.012  < 0.012














  11/23/20 11/23/20 11/24/20





  22:30 22:30 04:06


 


Creatine Kinase  28 L   28 L


 


CK-MB (CK-2)   < 0.22 


 


Troponin I   < 0.012 














  11/24/20 11/24/20 11/24/20





  04:06 09:58 09:58


 


Creatine Kinase   23 L 


 


CK-MB (CK-2)  < 0.22   < 0.22


 


Troponin I  < 0.012   < 0.012











Impressions: 


                                        





Chest X-Ray  11/23/20 00:00


IMPRESSION:  Right lower lobe airspace disease consistent with pneumonia.  

Stable mild cardiomegaly.


 








Head CT  11/23/20 00:00


IMPRESSION:


1. Large area of encephalomalacia and gliosis consistent with prior right 

frontoparietal infarct, stable.  No evidence of acute intracranial hemorrhage, 

mass, mass effect, or evidence of acute territorial infarct.  .


2. Right frontoparietal craniotomy stable.


EVIDENCE OF ACUTE STROKE: NO.


 








Chest/Abdomen CTA  11/23/20 15:59


IMPRESSION:


1. Extensive right pulmonary artery emboli.  There are CT findings of right 

heart strain.


2. Enlarged main pulmonary artery which can be seen with pulmonary arterial 

hypertension.


3. Right lower lobe patchy areas of consolidation which may represent infectiou

s/inflammatory process or pulmonary infarct.  Small right pleural effusion.


 














Assessment & Plan





- Diagnosis


(1) Pulmonary embolism


Qualifiers: 


   Pulmonary embolism type: other   Chronicity: acute   Acute cor pulmonale 

presence: with acute cor pulmonale   Qualified Code(s): I26.09 - Other pulmonary

embolism with acute cor pulmonale   


Is this a current diagnosis for this admission?: Yes   


Plan: 


She will continue intravenous heparin for 7 days, this will coincide with the 7-

day duration for the loading dose of Eliquis, she will be transitioned to p.o. 

Eliquis lifelong








(2) Paroxysmal atrial fibrillation


Is this a current diagnosis for this admission?: Yes   


Plan: 


Presently rate controlled








(3) S/P craniotomy


Is this a current diagnosis for this admission?: Yes   





(4) T2DM (type 2 diabetes mellitus)


Qualifiers: 


   Diabetes mellitus long term insulin use: without long term use   Diabetes 

mellitus complication status: with neurologic complications   Diabetes mellitus 

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 

diabetes mellitus with diabetic polyneuropathy   


Is this a current diagnosis for this admission?: Yes   





- Time


Time Spent with patient: 35 or more minutes


Level of Care: IMCU


Medications reviewed and adjusted accordingly: Yes


Anticipated discharge: Home


Anticipated DC Timeframe: within 72 hours

## 2020-11-29 RX ADMIN — ACETAMINOPHEN PRN MG: 325 TABLET ORAL at 15:50

## 2020-11-29 RX ADMIN — SUCRALFATE SCH GM: 1 TABLET ORAL at 09:42

## 2020-11-29 RX ADMIN — DIGOXIN SCH MG: 0.12 TABLET ORAL at 09:42

## 2020-11-29 RX ADMIN — ATORVASTATIN CALCIUM SCH MG: 10 TABLET, FILM COATED ORAL at 21:22

## 2020-11-29 RX ADMIN — INSULIN HUMAN SCH: 100 INJECTION, SOLUTION PARENTERAL at 11:00

## 2020-11-29 RX ADMIN — METOPROLOL TARTRATE SCH MG: 50 TABLET, FILM COATED ORAL at 21:21

## 2020-11-29 RX ADMIN — INSULIN HUMAN SCH: 100 INJECTION, SOLUTION PARENTERAL at 16:40

## 2020-11-29 RX ADMIN — SUCRALFATE SCH GM: 1 TABLET ORAL at 17:16

## 2020-11-29 RX ADMIN — PANTOPRAZOLE SODIUM SCH MG: 40 TABLET, DELAYED RELEASE ORAL at 08:45

## 2020-11-29 RX ADMIN — MONTELUKAST SODIUM SCH MG: 10 TABLET, FILM COATED ORAL at 21:23

## 2020-11-29 RX ADMIN — HEPARIN SODIUM PRN MLS/HR: 10000 INJECTION, SOLUTION INTRAVENOUS at 05:53

## 2020-11-29 RX ADMIN — POLYETHYLENE GLYCOL 3350 SCH: 17 POWDER, FOR SOLUTION ORAL at 09:44

## 2020-11-29 RX ADMIN — INSULIN HUMAN SCH: 100 INJECTION, SOLUTION PARENTERAL at 09:43

## 2020-11-29 RX ADMIN — HEPARIN SODIUM PRN MLS/HR: 10000 INJECTION, SOLUTION INTRAVENOUS at 18:33

## 2020-11-29 RX ADMIN — METOPROLOL TARTRATE SCH MG: 50 TABLET, FILM COATED ORAL at 09:42

## 2020-11-29 RX ADMIN — VENLAFAXINE HYDROCHLORIDE SCH MG: 75 CAPSULE, EXTENDED RELEASE ORAL at 09:42

## 2020-11-29 RX ADMIN — ACETAMINOPHEN PRN MG: 325 TABLET ORAL at 09:41

## 2020-11-29 RX ADMIN — INSULIN HUMAN SCH: 100 INJECTION, SOLUTION PARENTERAL at 21:19

## 2020-11-29 NOTE — PDOC PROGRESS REPORT
Subjective


Date:: 11/29/20


Subjective:: 





Patient was admitted yesterday when she presented with massive pulmonary embolis

m, presently on anticoagulation with IV heparin, stable hemodynamically





11/25/2020


Patient seen by the bedside, she, complain of headache, CT head from yesterday 

was negative,, she is on intravenous heparin for massive pulmonary embolism





11/26/2020


Patient was seen by the bedside She tends to keep food in her mouth for 

prolonged period of time,She will continue IV heparin for a total of 7 days





11/27/2020


Patient seen by the bedside, she continues on the IV heparin for a total of 7 

days and then transition to p.o. Eliquis lifelong





11/28/2020


Patient seen by the bedside, no new complaints she will continue IV heparin for 

a total of 7 days, today is day #5, she has 2 days more left before t

ransitioning to p.o. Eliquis





11/29/2020


Patient seen by the bedside, she continues to require IV heparin


Reason For Visit: 


SHORTNESS OF BREATH,PULMONARY EMBOLISM ON RIGHT








Physical Exam


Vital Signs: 


                                        











Temp Pulse Resp BP Pulse Ox


 


 97.3 F   91   18   158/96 H  94 


 


 11/29/20 15:54  11/29/20 15:54  11/29/20 15:54  11/29/20 15:54  11/29/20 15:54








                                 Intake & Output











 11/28/20 11/29/20 11/30/20





 06:59 06:59 06:59


 


Intake Total 1392 2135 716


 


Output Total 650 2200 450


 


Balance 742 -65 266


 


Weight 111.1 kg 111 kg 











General appearance: PRESENT: no acute distress


Eye exam: PRESENT: PERRLA


Respiratory exam: PRESENT: clear to auscultation nando


Cardiovascular exam: PRESENT: +S1, +S2


GI/Abdominal exam: PRESENT: soft


Neurological exam: PRESENT: alert





Results


Laboratory Results: 


                                        





                                 11/27/20 06:33 





                                 11/24/20 04:06 





                                        





11/23/20 13:02   Blood   Blood Culture - Final


                            NO GROWTH IN 5 DAYS





                                        











  11/23/20 11/23/20 11/23/20





  13:34 13:34 15:49


 


Creatine Kinase  46  


 


CK-MB (CK-2)   < 0.22 


 


Troponin I   < 0.012  < 0.012














  11/23/20 11/23/20 11/24/20





  22:30 22:30 04:06


 


Creatine Kinase  28 L   28 L


 


CK-MB (CK-2)   < 0.22 


 


Troponin I   < 0.012 














  11/24/20 11/24/20 11/24/20





  04:06 09:58 09:58


 


Creatine Kinase   23 L 


 


CK-MB (CK-2)  < 0.22   < 0.22


 


Troponin I  < 0.012   < 0.012











Impressions: 


                                        





Chest X-Ray  11/23/20 00:00


IMPRESSION:  Right lower lobe airspace disease consistent with pneumonia.  

Stable mild cardiomegaly.


 








Head CT  11/23/20 00:00


IMPRESSION:


1. Large area of encephalomalacia and gliosis consistent with prior right 

frontoparietal infarct, stable.  No evidence of acute intracranial hemorrhage, 

mass, mass effect, or evidence of acute territorial infarct.  .


2. Right frontoparietal craniotomy stable.


EVIDENCE OF ACUTE STROKE: NO.


 








Chest/Abdomen CTA  11/23/20 15:59


IMPRESSION:


1. Extensive right pulmonary artery emboli.  There are CT findings of right 

heart strain.


2. Enlarged main pulmonary artery which can be seen with pulmonary arterial 

hypertension.


3. Right lower lobe patchy areas of consolidation which may represent 

infectious/inflammatory process or pulmonary infarct.  Small right pleural 

effusion.


 














Assessment & Plan





- Diagnosis


(1) Pulmonary embolism


Qualifiers: 


   Pulmonary embolism type: other   Chronicity: acute   Acute cor pulmonale 

presence: with acute cor pulmonale   Qualified Code(s): I26.09 - Other pulmonary

embolism with acute cor pulmonale   


Is this a current diagnosis for this admission?: Yes   


Plan: 


She will continue intravenous heparin for 7 days, this will coincide with the 7-

day duration for the loading dose of Eliquis, she will be transitioned to p.o. 

Eliquis lifelong








(2) Paroxysmal atrial fibrillation


Is this a current diagnosis for this admission?: Yes   


Plan: 


Presently rate controlled








(3) S/P craniotomy


Is this a current diagnosis for this admission?: Yes   





(4) T2DM (type 2 diabetes mellitus)


Qualifiers: 


   Diabetes mellitus long term insulin use: without long term use   Diabetes 

mellitus complication status: with neurologic complications   Diabetes mellitus 

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 

diabetes mellitus with diabetic polyneuropathy   


Is this a current diagnosis for this admission?: Yes   





- Time


Time Spent with patient: 25-34 minutes


Level of Care: IMCU


Medications reviewed and adjusted accordingly: Yes


Anticipated discharge: Home


Anticipated DC Timeframe: within 48 hours

## 2020-11-30 LAB
ERYTHROCYTE [DISTWIDTH] IN BLOOD BY AUTOMATED COUNT: 17.5 % (ref 11.5–14)
HCT VFR BLD CALC: 35.3 % (ref 36–47)
HGB BLD-MCNC: 11.8 G/DL (ref 12–15.5)
MCH RBC QN AUTO: 26.5 PG (ref 27–33.4)
MCHC RBC AUTO-ENTMCNC: 33.4 G/DL (ref 32–36)
MCV RBC AUTO: 80 FL (ref 80–97)
PLATELET # BLD: 300 10^3/UL (ref 150–450)
RBC # BLD AUTO: 4.45 10^6/UL (ref 3.72–5.28)
WBC # BLD AUTO: 9 10^3/UL (ref 4–10.5)

## 2020-11-30 RX ADMIN — SUCRALFATE SCH GM: 1 TABLET ORAL at 09:25

## 2020-11-30 RX ADMIN — POLYETHYLENE GLYCOL 3350 SCH GM: 17 POWDER, FOR SOLUTION ORAL at 09:25

## 2020-11-30 RX ADMIN — MONTELUKAST SODIUM SCH MG: 10 TABLET, FILM COATED ORAL at 21:36

## 2020-11-30 RX ADMIN — VENLAFAXINE HYDROCHLORIDE SCH MG: 75 CAPSULE, EXTENDED RELEASE ORAL at 09:24

## 2020-11-30 RX ADMIN — ATORVASTATIN CALCIUM SCH MG: 10 TABLET, FILM COATED ORAL at 21:36

## 2020-11-30 RX ADMIN — ACETAMINOPHEN PRN MG: 325 TABLET ORAL at 03:18

## 2020-11-30 RX ADMIN — ACETAMINOPHEN PRN MG: 325 TABLET ORAL at 17:51

## 2020-11-30 RX ADMIN — METOPROLOL TARTRATE SCH MG: 50 TABLET, FILM COATED ORAL at 21:36

## 2020-11-30 RX ADMIN — INSULIN HUMAN SCH: 100 INJECTION, SOLUTION PARENTERAL at 17:45

## 2020-11-30 RX ADMIN — SUCRALFATE SCH GM: 1 TABLET ORAL at 17:48

## 2020-11-30 RX ADMIN — INSULIN HUMAN SCH: 100 INJECTION, SOLUTION PARENTERAL at 21:36

## 2020-11-30 RX ADMIN — INSULIN HUMAN SCH: 100 INJECTION, SOLUTION PARENTERAL at 12:18

## 2020-11-30 RX ADMIN — INSULIN HUMAN SCH: 100 INJECTION, SOLUTION PARENTERAL at 08:21

## 2020-11-30 RX ADMIN — DIGOXIN SCH MG: 0.12 TABLET ORAL at 12:21

## 2020-11-30 RX ADMIN — METOPROLOL TARTRATE SCH MG: 50 TABLET, FILM COATED ORAL at 09:24

## 2020-11-30 RX ADMIN — PANTOPRAZOLE SODIUM SCH MG: 40 TABLET, DELAYED RELEASE ORAL at 09:24

## 2020-11-30 RX ADMIN — HEPARIN SODIUM PRN MLS/HR: 10000 INJECTION, SOLUTION INTRAVENOUS at 22:18

## 2020-11-30 NOTE — PDOC PROGRESS REPORT
Subjective


Date:: 11/30/20


Subjective:: 





Patient was admitted yesterday when she presented with massive pulmonary embolis

m, presently on anticoagulation with IV heparin, stable hemodynamically





11/25/2020


Patient seen by the bedside, she, complain of headache, CT head from yesterday 

was negative,, she is on intravenous heparin for massive pulmonary embolism





11/26/2020


Patient was seen by the bedside She tends to keep food in her mouth for 

prolonged period of time,She will continue IV heparin for a total of 7 days





11/27/2020


Patient seen by the bedside, she continues on the IV heparin for a total of 7 

days and then transition to p.o. Eliquis lifelong





11/28/2020


Patient seen by the bedside, no new complaints she will continue IV heparin for 

a total of 7 days, today is day #5, she has 2 days more left before t

ransitioning to p.o. Eliquis





11/29/2020


Patient seen by the bedside, she continues to require IV heparin





11/30/2020


Patient seen by the bedside no new complaints


Reason For Visit: 


SHORTNESS OF BREATH,PULMONARY EMBOLISM ON RIGHT








Physical Exam


Vital Signs: 


                                        











Temp Pulse Resp BP Pulse Ox


 


 98.5 F   88   15   145/65 H  90 L


 


 11/30/20 19:18  11/30/20 19:18  11/30/20 19:18  11/30/20 19:18  11/30/20 19:18








                                 Intake & Output











 11/29/20 11/30/20 12/01/20





 06:59 06:59 06:59


 


Intake Total 2135 1438 541


 


Output Total 2200 1600 601


 


Balance -65 -162 -60


 


Weight 111 kg 110.6 kg 











General appearance: PRESENT: no acute distress


Eye exam: PRESENT: PERRLA


Respiratory exam: PRESENT: clear to auscultation nando


Cardiovascular exam: PRESENT: +S1, +S2


GI/Abdominal exam: PRESENT: soft


Neurological exam: PRESENT: alert





Results


Laboratory Results: 


                                        





                                 11/30/20 06:28 





                                 11/24/20 04:06 





                                        











  11/30/20





  06:28


 


WBC  9.0


 


RBC  4.45


 


Hgb  11.8 L


 


Hct  35.3 L


 


MCV  80


 


MCH  26.5 L


 


MCHC  33.4


 


RDW  17.5 H


 


Plt Count  300








                                        











  11/23/20 11/23/20 11/23/20





  13:34 13:34 15:49


 


Creatine Kinase  46  


 


CK-MB (CK-2)   < 0.22 


 


Troponin I   < 0.012  < 0.012














  11/23/20 11/23/20 11/24/20





  22:30 22:30 04:06


 


Creatine Kinase  28 L   28 L


 


CK-MB (CK-2)   < 0.22 


 


Troponin I   < 0.012 














  11/24/20 11/24/20 11/24/20





  04:06 09:58 09:58


 


Creatine Kinase   23 L 


 


CK-MB (CK-2)  < 0.22   < 0.22


 


Troponin I  < 0.012   < 0.012











Impressions: 


                                        





Chest X-Ray  11/23/20 00:00


IMPRESSION:  Right lower lobe airspace disease consistent with pneumonia.  

Stable mild cardiomegaly.


 








Head CT  11/23/20 00:00


IMPRESSION:


1. Large area of encephalomalacia and gliosis consistent with prior right 

frontoparietal infarct, stable.  No evidence of acute intracranial hemorrhage, 

mass, mass effect, or evidence of acute territorial infarct.  .


2. Right frontoparietal craniotomy stable.


EVIDENCE OF ACUTE STROKE: NO.


 








Chest/Abdomen CTA  11/23/20 15:59


IMPRESSION:


1. Extensive right pulmonary artery emboli.  There are CT findings of right 

heart strain.


2. Enlarged main pulmonary artery which can be seen with pulmonary arterial 

hypertension.


3. Right lower lobe patchy areas of consolidation which may represent infectio

us/inflammatory process or pulmonary infarct.  Small right pleural effusion.


 














Assessment & Plan





- Diagnosis


(1) Pulmonary embolism


Qualifiers: 


   Pulmonary embolism type: other   Chronicity: acute   Acute cor pulmonale 

presence: with acute cor pulmonale   Qualified Code(s): I26.09 - Other pulmonary

embolism with acute cor pulmonale   


Is this a current diagnosis for this admission?: Yes   


Plan: 


She will continue intravenous heparin for 7 days, this will coincide with the 7-

day duration for the loading dose of Eliquis, she will be transitioned to p.o. 

Eliquis lifelong








(2) Paroxysmal atrial fibrillation


Is this a current diagnosis for this admission?: Yes   


Plan: 


Presently rate controlled








(3) S/P craniotomy


Is this a current diagnosis for this admission?: Yes   





(4) T2DM (type 2 diabetes mellitus)


Qualifiers: 


   Diabetes mellitus long term insulin use: without long term use   Diabetes 

mellitus complication status: with neurologic complications   Diabetes mellitus 

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 

diabetes mellitus with diabetic polyneuropathy   


Is this a current diagnosis for this admission?: Yes   





- Time


Time Spent with patient: 25-34 minutes


Level of Care: IMCU


Medications reviewed and adjusted accordingly: Yes


Anticipated discharge: Home


Anticipated DC Timeframe: within 72 hours

## 2020-12-01 RX ADMIN — SUCRALFATE SCH GM: 1 TABLET ORAL at 21:41

## 2020-12-01 RX ADMIN — INSULIN HUMAN SCH UNIT: 100 INJECTION, SOLUTION PARENTERAL at 21:41

## 2020-12-01 RX ADMIN — APIXABAN SCH MG: 5 TABLET, FILM COATED ORAL at 21:41

## 2020-12-01 RX ADMIN — PANTOPRAZOLE SODIUM SCH MG: 40 TABLET, DELAYED RELEASE ORAL at 08:51

## 2020-12-01 RX ADMIN — ATORVASTATIN CALCIUM SCH MG: 10 TABLET, FILM COATED ORAL at 21:41

## 2020-12-01 RX ADMIN — METOPROLOL TARTRATE SCH MG: 50 TABLET, FILM COATED ORAL at 21:41

## 2020-12-01 RX ADMIN — ACETAMINOPHEN PRN MG: 325 TABLET ORAL at 14:16

## 2020-12-01 RX ADMIN — INSULIN HUMAN SCH: 100 INJECTION, SOLUTION PARENTERAL at 14:14

## 2020-12-01 RX ADMIN — INSULIN HUMAN SCH: 100 INJECTION, SOLUTION PARENTERAL at 16:42

## 2020-12-01 RX ADMIN — VENLAFAXINE HYDROCHLORIDE SCH MG: 75 CAPSULE, EXTENDED RELEASE ORAL at 10:50

## 2020-12-01 RX ADMIN — INSULIN HUMAN SCH: 100 INJECTION, SOLUTION PARENTERAL at 09:17

## 2020-12-01 RX ADMIN — DIGOXIN SCH MG: 0.12 TABLET ORAL at 10:51

## 2020-12-01 RX ADMIN — MONTELUKAST SODIUM SCH MG: 10 TABLET, FILM COATED ORAL at 22:00

## 2020-12-01 RX ADMIN — SUCRALFATE SCH GM: 1 TABLET ORAL at 10:51

## 2020-12-01 RX ADMIN — POLYETHYLENE GLYCOL 3350 SCH GM: 17 POWDER, FOR SOLUTION ORAL at 10:50

## 2020-12-01 RX ADMIN — METOPROLOL TARTRATE SCH MG: 50 TABLET, FILM COATED ORAL at 10:50

## 2020-12-01 RX ADMIN — POLYETHYLENE GLYCOL 3350 SCH: 17 POWDER, FOR SOLUTION ORAL at 10:56

## 2020-12-01 RX ADMIN — HEPARIN SODIUM PRN MLS/HR: 10000 INJECTION, SOLUTION INTRAVENOUS at 14:19

## 2020-12-01 NOTE — PDOC PROGRESS REPORT
Subjective


Date:: 12/01/20


Subjective:: 





Patient seen by the bedside, she has finished 7-day of IV heparin, She will be s

tarted on Eliquis 5 mg p.o. twice daily for life, she be discharge home tomorrow


Reason For Visit: 


SHORTNESS OF BREATH,PULMONARY EMBOLISM ON RIGHT








Physical Exam


Vital Signs: 


                                        











Temp Pulse Resp BP Pulse Ox


 


 98.5 F   77   18   106/76   93 


 


 12/01/20 16:02  12/01/20 16:02  12/01/20 16:02  12/01/20 16:02  12/01/20 16:02








                                 Intake & Output











 11/30/20 12/01/20 12/02/20





 06:59 06:59 06:59


 


Intake Total 1438 1235 924


 


Output Total 1600 601 


 


Balance -162 634 924


 


Weight 110.6 kg 111.6 kg 











General appearance: PRESENT: no acute distress


Eye exam: PRESENT: PERRLA


Respiratory exam: PRESENT: clear to auscultation nando


Cardiovascular exam: PRESENT: +S1, +S2


GI/Abdominal exam: PRESENT: soft


Neurological exam: PRESENT: alert, CN II-XII grossly intact





Results


Laboratory Results: 


                                        





                                 11/30/20 06:28 





                                 11/24/20 04:06 





                                        











  11/23/20 11/23/20 11/23/20





  13:34 13:34 15:49


 


Creatine Kinase  46  


 


CK-MB (CK-2)   < 0.22 


 


Troponin I   < 0.012  < 0.012














  11/23/20 11/23/20 11/24/20





  22:30 22:30 04:06


 


Creatine Kinase  28 L   28 L


 


CK-MB (CK-2)   < 0.22 


 


Troponin I   < 0.012 














  11/24/20 11/24/20 11/24/20





  04:06 09:58 09:58


 


Creatine Kinase   23 L 


 


CK-MB (CK-2)  < 0.22   < 0.22


 


Troponin I  < 0.012   < 0.012











Impressions: 


                                        





Chest X-Ray  11/23/20 00:00


IMPRESSION:  Right lower lobe airspace disease consistent with pneumonia.  

Stable mild cardiomegaly.


 








Head CT  11/23/20 00:00


IMPRESSION:


1. Large area of encephalomalacia and gliosis consistent with prior right 

frontoparietal infarct, stable.  No evidence of acute intracranial hemorrhage, 

mass, mass effect, or evidence of acute territorial infarct.  .


2. Right frontoparietal craniotomy stable.


EVIDENCE OF ACUTE STROKE: NO.


 








Chest/Abdomen CTA  11/23/20 15:59


IMPRESSION:


1. Extensive right pulmonary artery emboli.  There are CT findings of right 

heart strain.


2. Enlarged main pulmonary artery which can be seen with pulmonary arterial 

hypertension.


3. Right lower lobe patchy areas of consolidation which may represent 

infectious/inflammatory process or pulmonary infarct.  Small right pleural 

effusion.


 














Assessment & Plan





- Diagnosis


(1) Pulmonary embolism


Qualifiers: 


   Pulmonary embolism type: other   Chronicity: acute   Acute cor pulmonale 

presence: with acute cor pulmonale   Qualified Code(s): I26.09 - Other pulmonary

embolism with acute cor pulmonale   


Is this a current diagnosis for this admission?: Yes   


Plan: 


Start Eliquis








(2) Paroxysmal atrial fibrillation


Is this a current diagnosis for this admission?: Yes   


Plan: 


Presently rate controlled








(3) S/P craniotomy


Is this a current diagnosis for this admission?: Yes   





(4) T2DM (type 2 diabetes mellitus)


Qualifiers: 


   Diabetes mellitus long term insulin use: without long term use   Diabetes 

mellitus complication status: with neurologic complications   Diabetes mellitus 

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 

diabetes mellitus with diabetic polyneuropathy   


Is this a current diagnosis for this admission?: Yes   





- Time


Time Spent with patient: 15-24 minutes


Level of Care: MEDICAL


Medications reviewed and adjusted accordingly: Yes


Anticipated discharge: Home





- Inpatient Certification


Based on my medical assessment, after consideration of the patient's 

comorbidities, presenting symptoms, or acuity I expect that the services needed 

warrant INPATIENT care.: Yes


I certify that my determination is in accordance with my understanding of 

Medicare's requirements for reasonable and necessary INPATIENT services [42 CFR 

412.3e].: Yes

## 2020-12-01 NOTE — PDOC DISCHARGE SUMMARY
Impression





- Admit/DC Date/PCP


Admission Date/Primary Care Provider: 


  11/23/20 18:50





  GISELA DYKES MD





Discharge Date: 12/02/20





- Discharge Diagnosis


(1) Pulmonary embolism


Is this a current diagnosis for this admission?: Yes   





(2) Paroxysmal atrial fibrillation


Is this a current diagnosis for this admission?: Yes   





(3) S/P craniotomy


Is this a current diagnosis for this admission?: Yes   





(4) T2DM (type 2 diabetes mellitus)


Is this a current diagnosis for this admission?: Yes   





- Additional Information


Referrals: 


GISELA DYKES MD [Primary Care Provider] - Follow up as needed


Prescriptions: 


Apixaban [Eliquis 5 mg Tablet] 5 mg PO BID #60 tablet


Home Medications: 








Albuterol Sulfate [Ventolin Hfa 8 gm Mdi] 1 puff IH Q4HP PRN 10/08/20 


Montelukast Sodium [Singulair 10 mg Tablet] 10 mg PO QHS 10/08/20 


Pantoprazole Sodium [Protonix 40 mg Dr Tablet] 40 mg PO QAM 10/08/20 


Pravastatin Sodium 40 mg PO QHS 10/08/20 


Sennosides/Docusate 8.6-50 mg [Senna Plus Tablet] 2 tab PO HSP PRN 10/08/20 


Sucralfate [Carafate 1 gm Tablet] 1 gm PO BID 10/08/20 


Venlafaxine HCl ER [Effexor Xr 75 mg Cap.sr] 75 mg PO DAILY 10/08/20 


Ergocalciferol (Vitamin D2) [Vitamin D2] 50,000 unit PO MIKE@1000 10/15/20 


Digoxin [Lanoxin 0.125 mg Tablet] 0.125 mg PO DAILY #90 tablet 10/23/20 


Metoprolol Tartrate [Lopressor 50 mg Tablet] 75 mg PO Q12 #60 tablet 10/23/20 


Acetaminophen [Tylenol 325 mg Tablet] 650 mg PO Q4HP PRN 11/24/20 


Hydrochlorothiazide [Hydrodiuril 12.5 mg Tablet] 12.5 mg PO DAILY 11/24/20 


Polyethylene Glycol 3350 [Miralax Powder 17 gm/Packet] 17 gm PO DAILY 11/24/20 


Apixaban [Eliquis 5 mg Tablet] 5 mg PO BID #60 tablet 12/01/20 


Glucagon,Human Recombinant [Glucagen Inj 1 mg Vial] 1 mg IM PRN PRN  vial 

12/01/20 


Heparin Sodium,Porcine [Heparin Inj 1,000 Unit/ml 10 ml Vial] 0 - 15,000 unit IV

.BOLUS PER PROTOCOL PRN  vial 12/01/20 


Insulin Regular, Human [Humulin R (Reg) Insulin 100 unit/mL] 0 - 12 unit SUBCUT 

ACHS  unit 12/01/20 











Physical Exam


Vital Signs: 


                                        











Temp Pulse Resp BP Pulse Ox


 


 98.5 F   77   18   106/76   93 


 


 12/01/20 16:02  12/01/20 16:02  12/01/20 16:02  12/01/20 16:02  12/01/20 16:02








                                 Intake & Output











 11/30/20 12/01/20 12/02/20





 06:59 06:59 06:59


 


Intake Total 1438 1235 924


 


Output Total 1600 601 


 


Balance -162 634 924


 


Weight 110.6 kg 111.6 kg 














Results


Laboratory Results: 


                                        











WBC  9.0 10^3/uL (4.0-10.5)   11/30/20  06:28    


 


RBC  4.45 10^6/uL (3.72-5.28)   11/30/20  06:28    


 


Hgb  11.8 g/dL (12.0-15.5)  L  11/30/20  06:28    


 


Hct  35.3 % (36.0-47.0)  L  11/30/20  06:28    


 


MCV  80 fl (80-97)   11/30/20  06:28    


 


MCH  26.5 pg (27.0-33.4)  L  11/30/20  06:28    


 


MCHC  33.4 g/dL (32.0-36.0)   11/30/20  06:28    


 


RDW  17.5 % (11.5-14.0)  H  11/30/20  06:28    


 


Plt Count  300 10^3/uL (150-450)   11/30/20  06:28    


 


Lymph % (Auto)  19.0 % (13-45)   11/23/20  22:30    


 


Mono % (Auto)  8.0 % (3-13)   11/23/20  22:30    


 


Eos % (Auto)  1.6 % (0-6)   11/23/20  22:30    


 


Baso % (Auto)  0.3 % (0-2)   11/23/20  22:30    


 


Absolute Neuts (auto)  8.5 10^3/uL (1.7-8.2)  H  11/23/20  22:30    


 


Absolute Lymphs (auto)  2.3 10^3/uL (0.5-4.7)   11/23/20  22:30    


 


Absolute Monos (auto)  1.0 10^3/uL (0.1-1.4)   11/23/20  22:30    


 


Absolute Eos (auto)  0.2 10^3/uL (0.0-0.6)   11/23/20  22:30    


 


Absolute Basos (auto)  0.0 10^3/uL (0.0-0.2)   11/23/20  22:30    


 


Seg Neutrophils %  71.1 % (42-78)   11/23/20  22:30    


 


PT  16.0 SEC (11.4-15.4)  H  11/23/20  22:30    


 


INR  1.27   11/23/20  22:30    


 


APTT  77.9 SEC (23.5-35.8)  H  12/01/20  05:57    


 


Sodium  140.0 mmol/L (137-145)   11/24/20  04:06    


 


Potassium  3.6 mmol/L (3.6-5.0)   11/24/20  04:06    


 


Chloride  104 mmol/L ()   11/24/20  04:06    


 


Carbon Dioxide  24 mmol/L (22-30)   11/24/20  04:06    


 


Anion Gap  12  (5-19)   11/24/20  04:06    


 


BUN  11 mg/dL (7-20)   11/24/20  04:06    


 


Creatinine  0.49 mg/dL (0.52-1.25)  L  11/24/20  04:06    


 


Est GFR ( Amer)  > 60  (>60)   11/24/20  04:06    


 


Est GFR (MDRD) Non-Af  > 60  (>60)   11/24/20  04:06    


 


Glucose  159 mg/dL ()  H  11/24/20  04:06    


 


POC Glucose  139 mg/dL ()  H  12/01/20  16:05    


 


Hemoglobin A1c %  5.8 % (4.7-6.0)   11/24/20  04:06    


 


Lactic Acid  1.5 mmol/L (0.7-2.1)   11/23/20  13:02    


 


Calcium  9.1 mg/dL (8.4-10.2)   11/24/20  04:06    


 


Phosphorus  4.8 mg/dL (2.5-4.5)  H  11/23/20  22:30    


 


Magnesium  2.0 mg/dL (1.6-2.3)   11/23/20  22:30    


 


Total Bilirubin  0.4 mg/dL (0.2-1.3)   11/24/20  04:06    


 


Direct Bilirubin  0.2 mg/dL (0.0-0.4)   11/24/20  04:06    


 


Neonat Total Bilirubin  Not Reportable   11/24/20  04:06    


 


Neonat Direct Bilirubin  Not Reportable   11/24/20  04:06    


 


Neonat Indirect Bili  Not Reportable   11/24/20  04:06    


 


AST  17 U/L (14-36)   11/24/20  04:06    


 


ALT  9 U/L (<35)   11/24/20  04:06    


 


Alkaline Phosphatase  80 U/L ()   11/24/20  04:06    


 


Ammonia  9.0 umol/L (9-33)   11/23/20  22:30    


 


Creatine Kinase  23 U/L ()  L  11/24/20  09:58    


 


CK-MB (CK-2)  < 0.22 ng/mL (<4.55)   11/24/20  09:58    


 


Troponin I  < 0.012 ng/mL  11/24/20  09:58    


 


Total Protein  6.9 g/dL (6.3-8.2)   11/24/20  04:06    


 


Albumin  3.4 g/dL (3.5-5.0)  L  11/24/20  04:06    


 


Triglycerides  137 mg/dL (<150)   11/24/20  04:06    


 


Cholesterol  127.77 mg/dL (0-200)   11/24/20  04:06    


 


LDL Cholesterol Direct  70 mg/dL (<100)   11/24/20  04:06    


 


VLDL Cholesterol  27.0 mg/dL (10-31)   11/24/20  04:06    


 


HDL Cholesterol  30 mg/dL (>40)  L  11/24/20  04:06    


 


Amylase  41 U/L ()   11/23/20  22:30    


 


Lipase  87.4 U/L ()   11/23/20  22:30    


 


TSH  0.89 uIU/mL (0.47-4.68)   11/23/20  22:30    


 


Free T4  1.81 ng/dL (0.78-2.19)   11/23/20  22:30    


 


Urine Color  YELLOW   11/26/20  17:00    


 


Urine Appearance  CLEAR   11/26/20  17:00    


 


Urine pH  6.0  (5.0-9.0)   11/26/20  17:00    


 


Ur Specific Gravity  1.014   11/26/20  17:00    


 


Urine Protein  NEGATIVE mg/dL (NEGATIVE)   11/26/20  17:00    


 


Urine Glucose (UA)  NEGATIVE mg/dL (NEGATIVE)   11/26/20  17:00    


 


Urine Ketones  NEGATIVE mg/dL (NEGATIVE)   11/26/20  17:00    


 


Urine Blood  SMALL  (NEGATIVE)  H  11/26/20  17:00    


 


Urine Nitrite  NEGATIVE  (NEGATIVE)   11/26/20  17:00    


 


Urine Bilirubin  NEGATIVE  (NEGATIVE)   11/26/20  17:00    


 


Urine Urobilinogen  NEGATIVE mg/dL (<2.0)   11/26/20  17:00    


 


Ur Leukocyte Esterase  NEGATIVE  (NEGATIVE)   11/26/20  17:00    


 


Urine WBC (Auto)  2 /HPF  11/26/20  17:00    


 


Urine RBC (Auto)  1 /HPF  11/26/20  17:00    


 


U Hyaline Cast (Auto)  1 /LPF  11/24/20  03:00    


 


Squamous Epi Cells Auto  3 /HPF  11/24/20  03:00    


 


Calcium Oxalate Cr Auto  FEW /HPF  11/26/20  17:00    


 


Urine Mucus (Auto)  RARE /LPF  11/26/20  17:00    


 


Urine Ascorbic Acid  NEGATIVE  (NEGATIVE)   11/26/20  17:00    


 


Digoxin  0.48 ng/mL (0.8-2.0)  L  11/30/20  06:28    


 


Urine Opiates Screen  NEGATIVE   11/24/20  03:00    


 


Urine Methadone Screen  NEGATIVE   11/24/20  03:00    


 


Ur Barbiturates Screen  NEGATIVE   11/24/20  03:00    


 


Ur Phencyclidine Scrn  NEGATIVE   11/24/20  03:00    


 


Ur Amphetamines Screen  NEGATIVE   11/24/20  03:00    


 


U Benzodiazepines Scrn  NEGATIVE   11/24/20  03:00    


 


Urine Cocaine Screen  NEGATIVE   11/24/20  03:00    


 


U Marijuana (THC) Screen  UNCONFIRMED POSITIVE   11/24/20  03:00    


 


Influenza A (RT-PCR)  NEGATIVE  (NEGATIVE)   11/23/20  18:46    


 


Influenza B (RT-PCR)  NEGATIVE  (NEGATIVE)   11/23/20  18:46    


 


RSV (RT-PCR)  NEGATIVE  (NEGATIVE)   11/23/20  18:46    


 


SARS-CoV-2 Rap RNA(RT-PCR)  NEGATIVE  (NEGATIVE)   11/23/20  18:46    








                                        











  11/23/20 11/23/20 11/23/20





  13:34 15:49 22:30


 


CK-MB (CK-2)  < 0.22   < 0.22


 


Troponin I  < 0.012  < 0.012  < 0.012














  11/24/20 11/24/20





  04:06 09:58


 


CK-MB (CK-2)  < 0.22  < 0.22


 


Troponin I  < 0.012  < 0.012











Impressions: 


                                        





Chest X-Ray  11/23/20 00:00


IMPRESSION:  Right lower lobe airspace disease consistent with pneumonia.  

Stable mild cardiomegaly.


 








Head CT  11/23/20 00:00


IMPRESSION:


1. Large area of encephalomalacia and gliosis consistent with prior right 

frontoparietal infarct, stable.  No evidence of acute intracranial hemorrhage, 

mass, mass effect, or evidence of acute territorial infarct.  .


2. Right frontoparietal craniotomy stable.


EVIDENCE OF ACUTE STROKE: NO.


 








Chest/Abdomen CTA  11/23/20 15:59


IMPRESSION:


1. Extensive right pulmonary artery emboli.  There are CT findings of right 

heart strain.


2. Enlarged main pulmonary artery which can be seen with pulmonary arterial 

hypertension.


3. Right lower lobe patchy areas of consolidation which may represent 

infectious/inflammatory process or pulmonary infarct.  Small right pleural 

effusion.

## 2020-12-02 VITALS — SYSTOLIC BLOOD PRESSURE: 150 MMHG | DIASTOLIC BLOOD PRESSURE: 82 MMHG

## 2020-12-02 LAB
APPEARANCE UR: (no result)
APTT PPP: YELLOW S
BILIRUB UR QL STRIP: NEGATIVE
GLUCOSE UR STRIP-MCNC: NEGATIVE MG/DL
KETONES UR STRIP-MCNC: NEGATIVE MG/DL
NITRITE UR QL STRIP: NEGATIVE
PH UR STRIP: 5 [PH] (ref 5–9)
PROT UR STRIP-MCNC: NEGATIVE MG/DL
SP GR UR STRIP: 1.03
UROBILINOGEN UR-MCNC: 2 MG/DL (ref ?–2)

## 2020-12-02 RX ADMIN — METOPROLOL TARTRATE SCH MG: 50 TABLET, FILM COATED ORAL at 09:04

## 2020-12-02 RX ADMIN — POLYETHYLENE GLYCOL 3350 SCH: 17 POWDER, FOR SOLUTION ORAL at 09:09

## 2020-12-02 RX ADMIN — APIXABAN SCH MG: 5 TABLET, FILM COATED ORAL at 09:08

## 2020-12-02 RX ADMIN — DIGOXIN SCH MG: 0.12 TABLET ORAL at 09:07

## 2020-12-02 RX ADMIN — PANTOPRAZOLE SODIUM SCH MG: 40 TABLET, DELAYED RELEASE ORAL at 09:08

## 2020-12-02 RX ADMIN — INSULIN HUMAN SCH: 100 INJECTION, SOLUTION PARENTERAL at 08:11

## 2020-12-02 RX ADMIN — SUCRALFATE SCH GM: 1 TABLET ORAL at 09:07

## 2020-12-02 RX ADMIN — VENLAFAXINE HYDROCHLORIDE SCH MG: 75 CAPSULE, EXTENDED RELEASE ORAL at 09:08

## 2020-12-02 NOTE — PDOC DISCHARGE SUMMARY
Impression





- Admit/DC Date/PCP


Admission Date/Primary Care Provider: 


  11/23/20 18:50





  GISELA DYKES MD





Discharge Date: 12/02/20





- Discharge Diagnosis


(1) Pulmonary embolism


Is this a current diagnosis for this admission?: Yes   





(2) Paroxysmal atrial fibrillation


Is this a current diagnosis for this admission?: Yes   





(3) S/P craniotomy


Is this a current diagnosis for this admission?: Yes   





(4) T2DM (type 2 diabetes mellitus)


Is this a current diagnosis for this admission?: Yes   





- Additional Information


Referrals: 


GISELA DYKES MD [Primary Care Provider] - 12/08/20 2:30 pm


Prescriptions: 


RX: Apixaban [Eliquis 5 mg Tablet] 5 mg PO BID #60 tablet


Home Medications: 








RX: Albuterol Sulfate [Ventolin Hfa 8 gm Mdi] 1 puff IH Q4HP PRN 10/08/20 


RX: Montelukast Sodium [Singulair 10 mg Tablet] 10 mg PO QHS 10/08/20 


RX: Pantoprazole Sodium [Protonix 40 mg Dr Tablet] 40 mg PO QAM 10/08/20 


RX: Pravastatin Sodium 40 mg PO QHS 10/08/20 


RX: Sennosides/Docusate 8.6-50 mg [Senna Plus Tablet] 2 tab PO HSP PRN 10/08/20 


RX: Sucralfate [Carafate 1 gm Tablet] 1 gm PO BID 10/08/20 


RX: Venlafaxine HCl ER [Effexor Xr 75 mg Cap.sr] 75 mg PO DAILY 10/08/20 


RX: Ergocalciferol (Vitamin D2) [Vitamin D2] 50,000 unit PO MIKE@1000 10/15/20 


RX: Digoxin [Lanoxin 0.125 mg Tablet] 0.125 mg PO DAILY #90 tablet 10/23/20 


RX: Metoprolol Tartrate [Lopressor 50 mg Tablet] 75 mg PO Q12 #60 tablet 

10/23/20 


RX: Acetaminophen [Tylenol 325 mg Tablet] 650 mg PO Q4HP PRN 11/24/20 


RX: Hydrochlorothiazide [Hydrodiuril 12.5 mg Tablet] 12.5 mg PO DAILY 11/24/20 


RX: Polyethylene Glycol 3350 [Miralax Powder 17 gm/Packet] 17 gm PO DAILY 

11/24/20 


RX: Apixaban [Eliquis 5 mg Tablet] 5 mg PO BID #60 tablet 12/01/20 


RX: Glucagon,Human Recombinant [Glucagen Inj 1 mg Vial] 1 mg IM PRN PRN  vial 

12/01/20 


RX: Heparin Sodium,Porcine [Heparin Inj 1,000 Unit/ml 10 ml Vial] 0 - 15,000 

unit IV .BOLUS PER PROTOCOL PRN  vial 12/01/20 


RX: Insulin Regular, Human [Humulin R (Reg) Insulin 100 unit/mL] 0 - 12 unit 

SUBCUT ACHS  unit 12/01/20 











History of Present Illiness


History of Present Illness: 


SHUN SUAREZ is a 65 year old female, She had a stroke on 09/21/2020 while she

was in [place], this was treated with thrombolytic followed by thrombectomy 

complicated with cerebral edema and midline shift treated with decompressive 

hemicraniotomy on 09/22/2020.  She was admitted previously for evaluation and 

management of paroxysmal atrial fibrillation with rapid ventricular  

response.She came to the emergency room for evaluation of shortness of breath, 

hemoptysis, she said the symptoms has progressively worsen in the last couple of

days.  In the emergency room she had CT angiogram of the chest, it demonstrated 

patchy areas of consolidation in the right lower lobe with a small left pleural 

effusion also found was extensive filling defect in the right main, upper, 

middle , and lower lobe, lobar and segmental pulmonary arteries, small amount of

thrombus in the left lower lobe pulmonary artery.  A rapid SARS-CoV-2 test was 

negative, a confirmatory PCR test was also negative.








Hospital Course


Hospital Course: 


Patient was admitted for the management of pulmonary embolism, she was treated 

with intravenous heparin for 7 days.  She will transition to p.o. Eliquis 

lifelong.  She had a massive pulmonary embolism, she is status post craniotomy 

in September due to a stroke.The hospital course was uneventful there was no 

bleed for the last 7 days.  The 7 days of the IV heparin correlate with a 7 days

duration for the loading dose of Eliquis.  Intravenous heparin is preferred 

because of the large clot burden.





Physical Exam


Vital Signs: 


                                        











Temp Pulse Resp BP Pulse Ox


 


 98.6 F   88   18   150/82 H  97 


 


 12/02/20 09:43  12/02/20 09:43  12/02/20 09:43  12/02/20 09:43  12/02/20 09:43








                                 Intake & Output











 12/01/20 12/02/20 12/03/20





 06:59 06:59 06:59


 


Intake Total 1235 1081 


 


Output Total 601 200 


 


Balance 634 881 


 


Weight 111.6 kg 112.6 kg 











General appearance: PRESENT: no acute distress


Eye exam: PRESENT: PERRLA


Respiratory exam: PRESENT: clear to auscultation nando


Cardiovascular exam: PRESENT: +S1, +S2


GI/Abdominal exam: PRESENT: soft


Neurological exam: PRESENT: alert





Results


Laboratory Results: 


                                        











WBC  9.0 10^3/uL (4.0-10.5)   11/30/20  06:28    


 


RBC  4.45 10^6/uL (3.72-5.28)   11/30/20  06:28    


 


Hgb  11.8 g/dL (12.0-15.5)  L  11/30/20  06:28    


 


Hct  35.3 % (36.0-47.0)  L  11/30/20  06:28    


 


MCV  80 fl (80-97)   11/30/20  06:28    


 


MCH  26.5 pg (27.0-33.4)  L  11/30/20  06:28    


 


MCHC  33.4 g/dL (32.0-36.0)   11/30/20  06:28    


 


RDW  17.5 % (11.5-14.0)  H  11/30/20  06:28    


 


Plt Count  300 10^3/uL (150-450)   11/30/20  06:28    


 


Lymph % (Auto)  19.0 % (13-45)   11/23/20  22:30    


 


Mono % (Auto)  8.0 % (3-13)   11/23/20  22:30    


 


Eos % (Auto)  1.6 % (0-6)   11/23/20  22:30    


 


Baso % (Auto)  0.3 % (0-2)   11/23/20  22:30    


 


Absolute Neuts (auto)  8.5 10^3/uL (1.7-8.2)  H  11/23/20  22:30    


 


Absolute Lymphs (auto)  2.3 10^3/uL (0.5-4.7)   11/23/20  22:30    


 


Absolute Monos (auto)  1.0 10^3/uL (0.1-1.4)   11/23/20  22:30    


 


Absolute Eos (auto)  0.2 10^3/uL (0.0-0.6)   11/23/20  22:30    


 


Absolute Basos (auto)  0.0 10^3/uL (0.0-0.2)   11/23/20  22:30    


 


Seg Neutrophils %  71.1 % (42-78)   11/23/20  22:30    


 


PT  16.0 SEC (11.4-15.4)  H  11/23/20  22:30    


 


INR  1.27   11/23/20  22:30    


 


APTT  77.9 SEC (23.5-35.8)  H  12/01/20  05:57    


 


Sodium  140.0 mmol/L (137-145)   11/24/20  04:06    


 


Potassium  3.6 mmol/L (3.6-5.0)   11/24/20  04:06    


 


Chloride  104 mmol/L ()   11/24/20  04:06    


 


Carbon Dioxide  24 mmol/L (22-30)   11/24/20  04:06    


 


Anion Gap  12  (5-19)   11/24/20  04:06    


 


BUN  11 mg/dL (7-20)   11/24/20  04:06    


 


Creatinine  0.49 mg/dL (0.52-1.25)  L  11/24/20  04:06    


 


Est GFR ( Amer)  > 60  (>60)   11/24/20  04:06    


 


Est GFR (MDRD) Non-Af  > 60  (>60)   11/24/20  04:06    


 


Glucose  159 mg/dL ()  H  11/24/20  04:06    


 


POC Glucose  149 mg/dL ()  H  12/02/20  07:54    


 


Hemoglobin A1c %  5.8 % (4.7-6.0)   11/24/20  04:06    


 


Lactic Acid  1.5 mmol/L (0.7-2.1)   11/23/20  13:02    


 


Calcium  9.1 mg/dL (8.4-10.2)   11/24/20  04:06    


 


Phosphorus  4.8 mg/dL (2.5-4.5)  H  11/23/20  22:30    


 


Magnesium  2.0 mg/dL (1.6-2.3)   11/23/20  22:30    


 


Total Bilirubin  0.4 mg/dL (0.2-1.3)   11/24/20  04:06    


 


Direct Bilirubin  0.2 mg/dL (0.0-0.4)   11/24/20  04:06    


 


Neonat Total Bilirubin  Not Reportable   11/24/20  04:06    


 


Neonat Direct Bilirubin  Not Reportable   11/24/20  04:06    


 


Neonat Indirect Bili  Not Reportable   11/24/20  04:06    


 


AST  17 U/L (14-36)   11/24/20  04:06    


 


ALT  9 U/L (<35)   11/24/20  04:06    


 


Alkaline Phosphatase  80 U/L ()   11/24/20  04:06    


 


Ammonia  9.0 umol/L (9-33)   11/23/20  22:30    


 


Creatine Kinase  23 U/L ()  L  11/24/20  09:58    


 


CK-MB (CK-2)  < 0.22 ng/mL (<4.55)   11/24/20  09:58    


 


Troponin I  < 0.012 ng/mL  11/24/20  09:58    


 


Total Protein  6.9 g/dL (6.3-8.2)   11/24/20  04:06    


 


Albumin  3.4 g/dL (3.5-5.0)  L  11/24/20  04:06    


 


Triglycerides  137 mg/dL (<150)   11/24/20  04:06    


 


Cholesterol  127.77 mg/dL (0-200)   11/24/20  04:06    


 


LDL Cholesterol Direct  70 mg/dL (<100)   11/24/20  04:06    


 


VLDL Cholesterol  27.0 mg/dL (10-31)   11/24/20  04:06    


 


HDL Cholesterol  30 mg/dL (>40)  L  11/24/20  04:06    


 


Amylase  41 U/L ()   11/23/20  22:30    


 


Lipase  87.4 U/L ()   11/23/20  22:30    


 


TSH  0.89 uIU/mL (0.47-4.68)   11/23/20  22:30    


 


Free T4  1.81 ng/dL (0.78-2.19)   11/23/20  22:30    


 


Urine Color  YELLOW   12/01/20  23:26    


 


Urine Appearance  SLIGHTLY-CLOUDY   12/01/20  23:26    


 


Urine pH  5.0  (5.0-9.0)   12/01/20  23:26    


 


Ur Specific Gravity  1.027   12/01/20  23:26    


 


Urine Protein  NEGATIVE mg/dL (NEGATIVE)   12/01/20  23:26    


 


Urine Glucose (UA)  NEGATIVE mg/dL (NEGATIVE)   12/01/20  23:26    


 


Urine Ketones  NEGATIVE mg/dL (NEGATIVE)   12/01/20  23:26    


 


Urine Blood  NEGATIVE  (NEGATIVE)   12/01/20  23:26    


 


Urine Nitrite  NEGATIVE  (NEGATIVE)   12/01/20  23:26    


 


Urine Bilirubin  NEGATIVE  (NEGATIVE)   12/01/20  23:26    


 


Urine Urobilinogen  2.0 mg/dL (<2.0)  H  12/01/20  23:26    


 


Ur Leukocyte Esterase  NEGATIVE  (NEGATIVE)   12/01/20  23:26    


 


Urine WBC (Auto)  2 /HPF  12/01/20  23:26    


 


Urine RBC (Auto)  1 /HPF  12/01/20  23:26    


 


U Hyaline Cast (Auto)  1 /LPF  11/24/20  03:00    


 


Urine Bacteria (Auto)  3+ /HPF  12/01/20  23:26    


 


Squamous Epi Cells Auto  3 /HPF  12/01/20  23:26    


 


Calcium Oxalate Cr Auto  FEW /HPF  12/01/20  23:26    


 


Urine Mucus (Auto)  RARE /LPF  11/26/20  17:00    


 


Urine Ascorbic Acid  NEGATIVE  (NEGATIVE)   12/01/20  23:26    


 


Digoxin  0.48 ng/mL (0.8-2.0)  L  11/30/20  06:28    


 


Urine Opiates Screen  NEGATIVE   11/24/20  03:00    


 


Urine Methadone Screen  NEGATIVE   11/24/20  03:00    


 


Ur Barbiturates Screen  NEGATIVE   11/24/20  03:00    


 


Ur Phencyclidine Scrn  NEGATIVE   11/24/20  03:00    


 


Ur Amphetamines Screen  NEGATIVE   11/24/20  03:00    


 


U Benzodiazepines Scrn  NEGATIVE   11/24/20  03:00    


 


Urine Cocaine Screen  NEGATIVE   11/24/20  03:00    


 


U Marijuana (THC) Screen  UNCONFIRMED POSITIVE   11/24/20  03:00    


 


Influenza A (RT-PCR)  NEGATIVE  (NEGATIVE)   11/23/20  18:46    


 


Influenza B (RT-PCR)  NEGATIVE  (NEGATIVE)   11/23/20  18:46    


 


RSV (RT-PCR)  NEGATIVE  (NEGATIVE)   11/23/20  18:46    


 


SARS-CoV-2 Rap RNA(RT-PCR)  NEGATIVE  (NEGATIVE)   11/23/20  18:46    








                                        











  11/23/20 11/23/20 11/23/20





  13:34 15:49 22:30


 


CK-MB (CK-2)  < 0.22   < 0.22


 


Troponin I  < 0.012  < 0.012  < 0.012














  11/24/20 11/24/20





  04:06 09:58


 


CK-MB (CK-2)  < 0.22  < 0.22


 


Troponin I  < 0.012  < 0.012











Impressions: 


                                        





Chest X-Ray  11/23/20 00:00


IMPRESSION:  Right lower lobe airspace disease consistent with pneumonia.  

Stable mild cardiomegaly.


 








Head CT  11/23/20 00:00


IMPRESSION:


1. Large area of encephalomalacia and gliosis consistent with prior right 

frontoparietal infarct, stable.  No evidence of acute intracranial hemorrhage, 

mass, mass effect, or evidence of acute territorial infarct.  .


2. Right frontoparietal craniotomy stable.


EVIDENCE OF ACUTE STROKE: NO.


 








Chest/Abdomen CTA  11/23/20 15:59


IMPRESSION:


1. Extensive right pulmonary artery emboli.  There are CT findings of right 

heart strain.


2. Enlarged main pulmonary artery which can be seen with pulmonary arterial 

hypertension.


3. Right lower lobe patchy areas of consolidation which may represent 

infectious/inflammatory process or pulmonary infarct.  Small right pleural 

effusion.


 














Stroke


Is this a Stroke Patient?: No





Acute Heart Failure


Is this a Heart Failure Patient?: No